# Patient Record
Sex: FEMALE | Race: BLACK OR AFRICAN AMERICAN | NOT HISPANIC OR LATINO | Employment: OTHER | URBAN - METROPOLITAN AREA
[De-identification: names, ages, dates, MRNs, and addresses within clinical notes are randomized per-mention and may not be internally consistent; named-entity substitution may affect disease eponyms.]

---

## 2023-03-29 ENCOUNTER — HOSPITAL ENCOUNTER (OUTPATIENT)
Dept: DATA CONVERSION | Facility: HOSPITAL | Age: 83
End: 2023-03-29
Attending: INTERNAL MEDICINE | Admitting: INTERNAL MEDICINE
Payer: MEDICARE

## 2023-03-29 DIAGNOSIS — E11.9 TYPE 2 DIABETES MELLITUS WITHOUT COMPLICATIONS (MULTI): ICD-10-CM

## 2023-03-29 DIAGNOSIS — I13.0 HYPERTENSIVE HEART AND CHRONIC KIDNEY DISEASE WITH HEART FAILURE AND STAGE 1 THROUGH STAGE 4 CHRONIC KIDNEY DISEASE, OR UNSPECIFIED CHRONIC KIDNEY DISEASE (MULTI): ICD-10-CM

## 2023-03-29 DIAGNOSIS — E04.2 NONTOXIC MULTINODULAR GOITER: ICD-10-CM

## 2023-03-29 DIAGNOSIS — N18.30 CHRONIC KIDNEY DISEASE, STAGE 3 UNSPECIFIED (MULTI): ICD-10-CM

## 2023-03-29 DIAGNOSIS — I50.9 HEART FAILURE, UNSPECIFIED (MULTI): ICD-10-CM

## 2023-03-29 DIAGNOSIS — E11.22 TYPE 2 DIABETES MELLITUS WITH DIABETIC CHRONIC KIDNEY DISEASE (MULTI): ICD-10-CM

## 2023-03-29 DIAGNOSIS — E04.9 NONTOXIC GOITER, UNSPECIFIED: ICD-10-CM

## 2023-03-30 LAB
COMPLETE PATHOLOGY REPORT: NORMAL
CONVERTED ADDENDUM DIAGNOSIS 2: NORMAL
CONVERTED CLINICAL DIAGNOSIS-HISTORY: NORMAL
CONVERTED DIAGNOSIS COMMENT: NORMAL
CONVERTED FINAL DIAGNOSIS: NORMAL
CONVERTED FINAL REPORT PDF LINK TO COPY AND PASTE: NORMAL
CONVERTED SPECIMEN DESCRIPTION: NORMAL

## 2023-04-10 LAB
ALBUMIN (G/DL) IN SER/PLAS: 4 G/DL (ref 3.4–5)
ALBUMIN (MG/L) IN URINE: 33.4 MG/L
ALBUMIN/CREATININE (UG/MG) IN URINE: 12.8 UG/MG CRT (ref 0–30)
ANION GAP IN SER/PLAS: 11 MMOL/L (ref 10–20)
APPEARANCE, URINE: ABNORMAL
BACTERIA, URINE: ABNORMAL /HPF
BASOPHILS (10*3/UL) IN BLOOD BY AUTOMATED COUNT: 0.03 X10E9/L (ref 0–0.1)
BASOPHILS/100 LEUKOCYTES IN BLOOD BY AUTOMATED COUNT: 1 % (ref 0–2)
BILIRUBIN, URINE: NEGATIVE
BLOOD, URINE: NEGATIVE
BUDDING YEAST, URINE: PRESENT /HPF
CALCIUM (MG/DL) IN SER/PLAS: 8.8 MG/DL (ref 8.6–10.3)
CARBON DIOXIDE, TOTAL (MMOL/L) IN SER/PLAS: 29 MMOL/L (ref 21–32)
CHLORIDE (MMOL/L) IN SER/PLAS: 105 MMOL/L (ref 98–107)
COLOR, URINE: YELLOW
CREATININE (MG/DL) IN SER/PLAS: 1.07 MG/DL (ref 0.5–1.05)
CREATININE (MG/DL) IN URINE: 260 MG/DL (ref 20–320)
EOSINOPHILS (10*3/UL) IN BLOOD BY AUTOMATED COUNT: 0.05 X10E9/L (ref 0–0.4)
EOSINOPHILS/100 LEUKOCYTES IN BLOOD BY AUTOMATED COUNT: 1.7 % (ref 0–6)
ERYTHROCYTE DISTRIBUTION WIDTH (RATIO) BY AUTOMATED COUNT: 15.3 % (ref 11.5–14.5)
ERYTHROCYTE MEAN CORPUSCULAR HEMOGLOBIN CONCENTRATION (G/DL) BY AUTOMATED: 31.2 G/DL (ref 32–36)
ERYTHROCYTE MEAN CORPUSCULAR VOLUME (FL) BY AUTOMATED COUNT: 91 FL (ref 80–100)
ERYTHROCYTES (10*6/UL) IN BLOOD BY AUTOMATED COUNT: 4.2 X10E12/L (ref 4–5.2)
GFR FEMALE: 52 ML/MIN/1.73M2
GLUCOSE (MG/DL) IN SER/PLAS: 84 MG/DL (ref 74–99)
GLUCOSE, URINE: NEGATIVE MG/DL
HEMATOCRIT (%) IN BLOOD BY AUTOMATED COUNT: 38.2 % (ref 36–46)
HEMOGLOBIN (G/DL) IN BLOOD: 11.9 G/DL (ref 12–16)
HYALINE CASTS, URINE: ABNORMAL /LPF
IMMATURE GRANULOCYTES/100 LEUKOCYTES IN BLOOD BY AUTOMATED COUNT: 0 % (ref 0–0.9)
KETONES, URINE: NEGATIVE MG/DL
LEUKOCYTE ESTERASE, URINE: ABNORMAL
LEUKOCYTES (10*3/UL) IN BLOOD BY AUTOMATED COUNT: 2.9 X10E9/L (ref 4.4–11.3)
LYMPHOCYTES (10*3/UL) IN BLOOD BY AUTOMATED COUNT: 1.34 X10E9/L (ref 0.8–3)
LYMPHOCYTES/100 LEUKOCYTES IN BLOOD BY AUTOMATED COUNT: 45.9 % (ref 13–44)
MONOCYTES (10*3/UL) IN BLOOD BY AUTOMATED COUNT: 0.36 X10E9/L (ref 0.05–0.8)
MONOCYTES/100 LEUKOCYTES IN BLOOD BY AUTOMATED COUNT: 12.3 % (ref 2–10)
MUCUS, URINE: ABNORMAL /LPF
NEUTROPHILS (10*3/UL) IN BLOOD BY AUTOMATED COUNT: 1.14 X10E9/L (ref 1.6–5.5)
NEUTROPHILS/100 LEUKOCYTES IN BLOOD BY AUTOMATED COUNT: 39.1 % (ref 40–80)
NITRITE, URINE: NEGATIVE
PARATHYRIN INTACT (PG/ML) IN SER/PLAS: 22.1 PG/ML (ref 18.5–88)
PH, URINE: 5 (ref 5–8)
PHOSPHATE (MG/DL) IN SER/PLAS: 3.1 MG/DL (ref 2.5–4.9)
PLATELETS (10*3/UL) IN BLOOD AUTOMATED COUNT: 235 X10E9/L (ref 150–450)
POTASSIUM (MMOL/L) IN SER/PLAS: 4.2 MMOL/L (ref 3.5–5.3)
PROTEIN, URINE: ABNORMAL MG/DL
RBC, URINE: 6 /HPF (ref 0–5)
SODIUM (MMOL/L) IN SER/PLAS: 141 MMOL/L (ref 136–145)
SPECIFIC GRAVITY, URINE: 1.02 (ref 1–1.03)
SQUAMOUS EPITHELIAL CELLS, URINE: 14 /HPF
UREA NITROGEN (MG/DL) IN SER/PLAS: 14 MG/DL (ref 6–23)
UROBILINOGEN, URINE: <2 MG/DL (ref 0–1.9)
WBC, URINE: 18 /HPF (ref 0–5)

## 2023-05-17 ENCOUNTER — TELEPHONE (OUTPATIENT)
Dept: PRIMARY CARE | Facility: CLINIC | Age: 83
End: 2023-05-17
Payer: MEDICARE

## 2023-09-11 PROBLEM — G45.3 AMAUROSIS FUGAX OF RIGHT EYE: Status: ACTIVE | Noted: 2023-09-11

## 2023-09-11 PROBLEM — M79.641 PAIN OF RIGHT HAND: Status: ACTIVE | Noted: 2023-09-11

## 2023-09-11 PROBLEM — M72.2 PLANTAR FASCIITIS, BILATERAL: Status: ACTIVE | Noted: 2023-09-11

## 2023-09-11 PROBLEM — M79.671 BILATERAL LEG AND FOOT PAIN: Status: ACTIVE | Noted: 2023-09-11

## 2023-09-11 PROBLEM — M25.559 LATERAL PAIN OF HIP: Status: ACTIVE | Noted: 2023-09-11

## 2023-09-11 PROBLEM — Z78.0 MENOPAUSE: Status: ACTIVE | Noted: 2023-09-11

## 2023-09-11 PROBLEM — I50.32 CHRONIC DIASTOLIC HEART FAILURE (MULTI): Status: ACTIVE | Noted: 2023-09-11

## 2023-09-11 PROBLEM — R47.01 EXPRESSIVE APHASIA: Status: ACTIVE | Noted: 2023-09-11

## 2023-09-11 PROBLEM — M89.9 SCAPULAR DYSFUNCTION: Status: ACTIVE | Noted: 2023-09-11

## 2023-09-11 PROBLEM — M76.30 ITB SYNDROME: Status: ACTIVE | Noted: 2023-09-11

## 2023-09-11 PROBLEM — M70.62 TROCHANTERIC BURSITIS OF LEFT HIP: Status: ACTIVE | Noted: 2023-09-11

## 2023-09-11 PROBLEM — H90.3 BILATERAL SENSORINEURAL HEARING LOSS: Status: ACTIVE | Noted: 2023-09-11

## 2023-09-11 PROBLEM — H26.493 AFTER CATARACT OF BOTH EYES NOT OBSCURING VISION: Status: ACTIVE | Noted: 2023-09-11

## 2023-09-11 PROBLEM — M79.672 BILATERAL LEG AND FOOT PAIN: Status: ACTIVE | Noted: 2023-09-11

## 2023-09-11 PROBLEM — M25.562 LEFT KNEE PAIN: Status: ACTIVE | Noted: 2023-09-11

## 2023-09-11 PROBLEM — G89.29 CHRONIC LEFT SHOULDER PAIN: Status: ACTIVE | Noted: 2023-09-11

## 2023-09-11 PROBLEM — E04.2 MULTINODULAR GOITER: Status: ACTIVE | Noted: 2023-09-11

## 2023-09-11 PROBLEM — L65.0 TELOGEN EFFLUVIUM: Status: ACTIVE | Noted: 2023-06-19

## 2023-09-11 PROBLEM — N18.30 CKD (CHRONIC KIDNEY DISEASE) STAGE 3, GFR 30-59 ML/MIN (MULTI): Status: ACTIVE | Noted: 2023-09-11

## 2023-09-11 PROBLEM — M79.604 BILATERAL LEG AND FOOT PAIN: Status: ACTIVE | Noted: 2023-09-11

## 2023-09-11 PROBLEM — M99.09 SEGMENTAL AND SOMATIC DYSFUNCTION: Status: ACTIVE | Noted: 2023-09-11

## 2023-09-11 PROBLEM — I49.5 SINUS NODE DYSFUNCTION (MULTI): Status: ACTIVE | Noted: 2023-09-11

## 2023-09-11 PROBLEM — I10 HTN (HYPERTENSION): Status: ACTIVE | Noted: 2023-09-11

## 2023-09-11 PROBLEM — R01.1 SYSTOLIC MURMUR: Status: ACTIVE | Noted: 2023-09-11

## 2023-09-11 PROBLEM — M79.18 MYALGIA, OTHER SITE: Status: ACTIVE | Noted: 2023-09-11

## 2023-09-11 PROBLEM — R00.1 BRADYCARDIA: Status: ACTIVE | Noted: 2023-09-11

## 2023-09-11 PROBLEM — L65.9 NONSCARRING HAIR LOSS, UNSPECIFIED: Status: ACTIVE | Noted: 2023-06-19

## 2023-09-11 PROBLEM — M17.11 OSTEOARTHRITIS OF RIGHT KNEE: Status: ACTIVE | Noted: 2023-09-11

## 2023-09-11 PROBLEM — L57.9 SKIN CHANGES DUE TO CHRONIC EXPOSURE TO NONIONIZING RADIATION, UNSPECIFIED: Status: ACTIVE | Noted: 2023-06-19

## 2023-09-11 PROBLEM — E78.5 HYPERLIPIDEMIA: Status: ACTIVE | Noted: 2023-09-11

## 2023-09-11 PROBLEM — M54.50 LOW BACK PAIN: Status: ACTIVE | Noted: 2023-09-11

## 2023-09-11 PROBLEM — R00.2 PALPITATIONS: Status: ACTIVE | Noted: 2023-09-11

## 2023-09-11 PROBLEM — M79.605 BILATERAL LEG AND FOOT PAIN: Status: ACTIVE | Noted: 2023-09-11

## 2023-09-11 PROBLEM — L85.3 XEROSIS CUTIS: Status: ACTIVE | Noted: 2023-06-19

## 2023-09-11 PROBLEM — G45.9 TIA (TRANSIENT ISCHEMIC ATTACK): Status: ACTIVE | Noted: 2023-09-11

## 2023-09-11 PROBLEM — M25.512 CHRONIC LEFT SHOULDER PAIN: Status: ACTIVE | Noted: 2023-09-11

## 2023-09-11 PROBLEM — H43.811 PVD (POSTERIOR VITREOUS DETACHMENT), RIGHT EYE: Status: ACTIVE | Noted: 2023-09-11

## 2023-09-11 PROBLEM — D64.9 ANEMIA: Status: ACTIVE | Noted: 2023-09-11

## 2023-09-11 PROBLEM — G45.3 AMAUROSIS FUGAX OF LEFT EYE: Status: ACTIVE | Noted: 2023-09-11

## 2023-09-11 PROBLEM — E11.9 DIABETES MELLITUS TYPE 2 WITHOUT RETINOPATHY (MULTI): Status: ACTIVE | Noted: 2023-09-11

## 2023-09-11 PROBLEM — M25.552 LATERAL PAIN OF LEFT HIP: Status: ACTIVE | Noted: 2023-09-11

## 2023-09-11 PROBLEM — Z96.1 PSEUDOPHAKIA OF BOTH EYES: Status: ACTIVE | Noted: 2023-09-11

## 2023-09-11 PROBLEM — H52.13 BILATERAL MYOPIA: Status: ACTIVE | Noted: 2023-09-11

## 2023-09-11 PROBLEM — R07.9 CHEST PAIN: Status: ACTIVE | Noted: 2023-09-11

## 2023-09-11 PROBLEM — Z87.39 H/O CALCIUM PYROPHOSPHATE DEPOSITION DISEASE (CPPD): Status: ACTIVE | Noted: 2023-09-11

## 2023-09-11 RX ORDER — ASPIRIN 81 MG/1
81 TABLET ORAL DAILY
COMMUNITY
End: 2023-10-13 | Stop reason: WASHOUT

## 2023-09-11 RX ORDER — HYDRALAZINE HYDROCHLORIDE 25 MG/1
2 TABLET, FILM COATED ORAL 2 TIMES DAILY
COMMUNITY
Start: 2022-01-25 | End: 2023-10-11

## 2023-09-11 RX ORDER — LISINOPRIL 10 MG/1
1 TABLET ORAL DAILY
COMMUNITY
Start: 2019-05-30 | End: 2023-10-05

## 2023-09-11 RX ORDER — BLOOD-GLUCOSE METER
1 EACH MISCELLANEOUS
COMMUNITY
Start: 2022-03-31 | End: 2023-10-13 | Stop reason: WASHOUT

## 2023-09-11 RX ORDER — AMLODIPINE BESYLATE 10 MG/1
10 TABLET ORAL DAILY
COMMUNITY
End: 2023-11-01

## 2023-09-11 RX ORDER — GABAPENTIN 100 MG/1
100 CAPSULE ORAL 3 TIMES DAILY PRN
COMMUNITY
Start: 2023-03-10 | End: 2023-10-13 | Stop reason: WASHOUT

## 2023-09-11 RX ORDER — METFORMIN HYDROCHLORIDE 500 MG/1
1 TABLET ORAL
COMMUNITY
Start: 2022-03-31 | End: 2023-10-13 | Stop reason: WASHOUT

## 2023-09-11 RX ORDER — LOSARTAN POTASSIUM 50 MG/1
100 TABLET ORAL DAILY
COMMUNITY
End: 2023-11-01 | Stop reason: DRUGHIGH

## 2023-10-05 ENCOUNTER — OFFICE VISIT (OUTPATIENT)
Dept: CARDIOLOGY | Facility: CLINIC | Age: 83
End: 2023-10-05
Payer: MEDICARE

## 2023-10-05 VITALS
WEIGHT: 130 LBS | SYSTOLIC BLOOD PRESSURE: 140 MMHG | HEIGHT: 65 IN | BODY MASS INDEX: 21.66 KG/M2 | DIASTOLIC BLOOD PRESSURE: 76 MMHG | HEART RATE: 57 BPM | OXYGEN SATURATION: 97 %

## 2023-10-05 DIAGNOSIS — R07.9 CHEST PAIN, UNSPECIFIED TYPE: Primary | ICD-10-CM

## 2023-10-05 PROCEDURE — 99215 OFFICE O/P EST HI 40 MIN: CPT

## 2023-10-05 PROCEDURE — 1125F AMNT PAIN NOTED PAIN PRSNT: CPT

## 2023-10-05 PROCEDURE — 3078F DIAST BP <80 MM HG: CPT

## 2023-10-05 PROCEDURE — 1036F TOBACCO NON-USER: CPT

## 2023-10-05 PROCEDURE — 1159F MED LIST DOCD IN RCRD: CPT

## 2023-10-05 PROCEDURE — 3077F SYST BP >= 140 MM HG: CPT

## 2023-10-05 NOTE — PROGRESS NOTES
Subjective   Patient ID: Mrs. Surekha Lorenzo is a 82-year-old -American female with a past medical history of hypertension, CKD stage III, hyperlipidemia and heart failure preserved EF 65-70%, left ventricular cavity size is decreased on echocardiogram March 20, 2022. Today here as a hospital follow up.     14 day Holter Monitor 1/25/22 -2/8/22 :There were 2 occurrences of supraventricular tachycardia with the longest episode 7 beats. There were 6 patient triggered events 1 SVE, and 6 bradycardia. Average HR of 48. There were no atrial fibrillation/a flutter, pauses, AV blocks noted.    Patient reports that she was recently evaluated in the ED with hypertension  systolic after PCP appt. Per patient she states that she only cuts dose of amlodipine in half. She also reports to having chest discomfort that comes and goes but does not worsen with activity. Patient reports that chest pain is not worsened by exercise. Patient continues to be bradycardiac and per EP at [facility] recommended low intensity protocol stress test to quantify chronotropic incompetency and hydralazine was discontinued. Pt denies shortness of breath, dyspnea on exertion, orthopnea, and paroxysmal nocturnal dyspnea.  Pt denies worsening lower extremity edema.  Pt denies palpitations or syncope.  No recent falls.  No fever or chills.  No cough.  No change in bowel or bladder habits.  No sick contacts.  No recent travel.  12 point review of systems including (Constitutional, Eyes, ENMT, Respiratory, Cardiac, Gastrointestinal, Neurological, Psychiatric, and Hematologic) was performed and is otherwise negative unless noted in HPI.    Past Medical History: As above.    Medications Reviewed.    Allergies were reviewed.    Social History: Denies use of tobacco or ETOH, She fairly active, lives at home alone.    Family History: No pertinent family history at this time.     Objective   I personally reviewed the patients recent labs, medications, orders,  EKGs, and pertinent cardiac imaging/ echocardiography and ischemic evaluations including stress testing/ cardiac catheterization.    Vitals:    10/05/23 0930   BP: 140/76   Pulse: 57   SpO2: 97%       Physical Exam  Constitutional:       Appearance: Normal appearance.   Neck:      Vascular: No carotid bruit.   Cardiovascular:      Rate and Rhythm: Normal rate and regular rhythm.      Heart sounds: No murmur heard.  Pulmonary:      Effort: Pulmonary effort is normal.      Breath sounds: Normal breath sounds.   Abdominal:      Palpations: Abdomen is soft.   Skin:     General: Skin is warm.   Neurological:      Mental Status: She is alert and oriented to person, place, and time.   Psychiatric:         Mood and Affect: Mood normal.       Assessment/Plan        # Uncontrolled HTN: mildly elevated today.  -Continue taking losartan 100 mg daily, amlodipine 10 mg daily.    # Chest Pain  - Nuclear exercise stress test for further ischemic evaluation and to determine  quantify chronotropic incompetency . Please avoid caffeine the day prior to and the day of the stress test. Do not eat the morning of the stress test or 12 hours prior.     -  Follow up with tele visit    No medication changes    Yue DEWEY-CNP

## 2023-10-09 ENCOUNTER — DOCUMENTATION (OUTPATIENT)
Dept: PHYSICAL THERAPY | Facility: CLINIC | Age: 83
End: 2023-10-09
Payer: MEDICARE

## 2023-10-09 NOTE — PROGRESS NOTES
Discharge Summary    Name: Surekha Lorenzo  MRN: [id number]  : 1940  Date: 10/09/23    Discharge Summary: PT    Discharge Information: Date of discharge 10/9/23, Date of last visit 23, Date of evaluation 7/10/23, Number of attended visits 6, Referred by Gonzalez, and Referred for L hip pain    Discharge Status: unknown     Rehab Discharge Reason: Attendance inconsistent and Failed to schedule and/or keep follow-up appointment(s)

## 2023-10-10 ENCOUNTER — APPOINTMENT (OUTPATIENT)
Dept: CARDIOLOGY | Facility: HOSPITAL | Age: 83
End: 2023-10-10
Payer: MEDICARE

## 2023-10-10 ENCOUNTER — APPOINTMENT (OUTPATIENT)
Dept: RADIOLOGY | Facility: HOSPITAL | Age: 83
End: 2023-10-10
Payer: MEDICARE

## 2023-10-11 ENCOUNTER — OFFICE VISIT (OUTPATIENT)
Dept: CARDIOLOGY | Facility: CLINIC | Age: 83
End: 2023-10-11
Payer: MEDICARE

## 2023-10-11 DIAGNOSIS — I10 PRIMARY HYPERTENSION: Primary | ICD-10-CM

## 2023-10-11 PROCEDURE — 1125F AMNT PAIN NOTED PAIN PRSNT: CPT

## 2023-10-11 PROCEDURE — 99441 PR PHYS/QHP TELEPHONE EVALUATION 5-10 MIN: CPT

## 2023-10-11 PROCEDURE — 1036F TOBACCO NON-USER: CPT

## 2023-10-11 PROCEDURE — 1159F MED LIST DOCD IN RCRD: CPT

## 2023-10-11 NOTE — PROGRESS NOTES
Mrs. Surekha Lorenzo is a 83-year-old -American female with a past medical history of hypertension, CKD stage III, hyperlipidemia and heart failure preserved EF 65-70%, left ventricular cavity size is decreased on echocardiogram March 20, 2022. Today we have a tele-visit to discuss medications.    She reports that she started taking hydralazine again and states that blood pressure is improved 130/70's, headaches have stopped.  Chest discomfort has lessoned. Pt denies shortness of breath, dyspnea on exertion, orthopnea, and paroxysmal nocturnal dyspnea.  Pt denies worsening lower extremity edema.  Pt denies palpitations or syncope.  No recent falls.  No fever or chills.  No cough.  No change in bowel or bladder habits.  No sick contacts.  No recent travel.    An interactive audio  telecommunication system which permits real time communications between the patient (at the originating site) and provider (at the distant site) was utilized to provide this telehealth service. Verbal consent was requested and obtained from the patient on this date for a telehealth visit. Time spent with patient was 10 minutes of which greater than 50 percent was spent counseling and coordinating care.    Patient to continue taking Hydralazine 25 mg one tab twice daily, aspirin 81 mg daily, losartan 100 mg daily.    Previous visit patient had c/o chest discomfort. Nuclear exercise stress ordered for chest pain as well as to quantify chronotropic incompetency. If unable to make heart rate patient may be switched to Lexiscan.  It is scheduled for next week.    Follow up in 4 weeks    Yue BLAKE     Patient to follow up

## 2023-10-12 ENCOUNTER — EVALUATION (OUTPATIENT)
Dept: SPEECH THERAPY | Facility: CLINIC | Age: 83
End: 2023-10-12
Payer: MEDICARE

## 2023-10-12 DIAGNOSIS — R47.01 EXPRESSIVE APHASIA: Primary | ICD-10-CM

## 2023-10-12 DIAGNOSIS — I69.320 APHASIA DUE TO RECENT STROKE: ICD-10-CM

## 2023-10-12 PROCEDURE — 92507 TX SP LANG VOICE COMM INDIV: CPT | Mod: GN | Performed by: SPEECH-LANGUAGE PATHOLOGIST

## 2023-10-12 NOTE — LETTER
October 13, 2023     Patient: Surekha Lorenoz   YOB: 1940   Date of Visit: 10/12/2023       To Whom it May Concern:    Surekha Lorenzo was seen in my clinic on 10/12/2023. She {Return to school/sport:75784}.    If you have any questions or concerns, please don't hesitate to call.         Sincerely,          Donya Borrero, SLP        CC: No Recipients

## 2023-10-12 NOTE — LETTER
October 13, 2023    BENNETT Lopez  [address], [address]  [place] [facility] [address]    Patient: Surekha Lorenzo   YOB: 1940   Date of Visit: 10/12/2023       Dear Yue Batista, Aprn-cnp  [address] [address]    The attached plan of care is being sent to you because your patient’s medical reimbursement requires that you certify the plan of care. Your signature is required to allow uninterrupted insurance coverage.      You may indicate your approval by signing below and faxing this form back to us at Dept Fax: [phone].    Please call Dept: [phone] with any questions or concerns.    Thank you for this referral,        BENNETT Lopez  [facility] [address] [place] [address]  [address] [address]    Payer: Payor: MEDICARE / Plan: MEDICARE PART A AND B / Product Type: *No Product type* /                                                                         Date:     Dear BENNETT Lopez,     Re: Ms. Surekha Lorenzo, MRN:[id number]    I certify that I have reviewed the attached plan of care and it is medically necessary for Ms. Surekha Lorenzo (1940) who is under my care.          ______________________________________                    _________________  Provider name and credentials                                           Date and time                                                                                           Plan of Care 10/12/23   Effective from: 10/12/2023  Effective to: 1/10/2024    Plan ID: [id number]            Participants as of Finalize on 10/13/2023    Name Type Comments Contact Info    BENNETT Lopez Speech Language Pathologist  [phone]    Imer Mccormick MD PCP - General  [phone]       Last Plan Note     Author: BENNETT Lopez Status: Incomplete Last edited: 10/12/2023 10:30 AM       Speech-Language Pathology    Outpatient Speech-Language Pathology Treatment     Patient Name: Surekha KAM  Bj  MRN: [id number]  Today's Date: 10/13/2023  Time Calculation  Start Time: 1030  Stop Time: 1115  Time Calculation (min): 45 min     Patient is being seen for their first follow-up visit in [facility] this date. For full history, evaluation, and other details from previous care to-date, please refer to past medical records in Ambulatory Electronic Medical Records. Most recent eval/re-eval was completed on 2023. Per patient report, patient was admitted to the hospital for several days earlier in September- her abilities have not changed much since her initial evaluation in 2023. Results of that evaluation are as follows:  Language:    Assessments completed: formal   Patient was given the Thurston Diagnostic Aphasia Examination (BDAE) this date to fully assess her speech and language abilities. Patient scored in the average range across all tasks except for naming and picture-word matching. Her articulatory agility was judged to be within normal limits. She demonstrated difficulty with word finding within conversational speech. Patient scores on subtests are as follows:  Articulatory Agility:  100th percentile  Phrase Length:  100th percentile  Grammatical Form:  100th percentile  Melodic Line:  100th percentile  Word Comprehension:  100th percentile  Commands: 10/10 100th percentile   Complex Ideational Material:  100th percentile  Automatized Sequences:  100th percentile  Repetition of Single Words:  100th percentile  Repetition of Sentences: / 100th percentile  Responsive Naming: 10/10 100th percentile  Thurston Naming Test: 8/15 50th percentile  Screening of Special Categories:  45th percentile  Matching across cases and scripts:  100th percentile  Number matchin/4 100th percentile  Word Identification- Picture/Word Matching: 3/4 40th percentile  Basic Oral Word Reading: 15/15 100th percentile  Oral Reading of Sentences with Comprehension:  100th  "percentile  Comprehension: 3/3 100th percentile  Reading Comprehension- Sentences and Paragraphs: 4/4 100th percentile  Mechanics of Writing: Patient was able to copy \"The quick brown bagley jumps over the lazy dog\"  Overall, patient demonstrates anomia which impairs her ability to communicate her thoughts with others at this time. She would benefit from skilled speech therapy at this time to improve her word finding abilities and help her communicate well with others.     Current Problem:   Patient Active Problem List   Diagnosis   • After cataract of both eyes not obscuring vision   • Anemia   • Bilateral leg and foot pain   • Bilateral myopia   • Bilateral sensorineural hearing loss   • Bradycardia   • Chest pain   • Chronic diastolic heart failure (CMS/HCC)   • Chronic left shoulder pain   • CKD (chronic kidney disease) stage 3, GFR 30-59 ml/min (CMS/HCC)   • Diabetes mellitus type 2 without retinopathy (CMS/HCC)   • HTN (hypertension)   • Hyperlipidemia   • Left knee pain   • Low back pain   • Multinodular goiter   • Myalgia, other site   • Nonscarring hair loss, unspecified   • Osteoarthritis of right knee   • Pain of right hand   • Palpitations   • Plantar fasciitis, bilateral   • Pseudophakia of both eyes   • Scapular dysfunction   • Segmental and somatic dysfunction   • Sinus node dysfunction (CMS/HCC)   • Skin changes due to chronic exposure to nonionizing radiation, unspecified   • Systolic murmur   • Telogen effluvium   • Trochanteric bursitis of left hip   • Xerosis cutis   • TIA (transient ischemic attack)   • PVD (posterior vitreous detachment), right eye   • Menopause   • Lateral pain of left hip   • Lateral pain of hip   • ITB syndrome   • H/O calcium pyrophosphate deposition disease (CPPD)   • Expressive aphasia   • Amaurosis fugax of left eye   • Amaurosis fugax of right eye   • Aphasia due to recent stroke         SLP Assessment:   Patient was able to generate words to definition in 70% of " opportunities. She was able to add words to a list of 3 based on category in 65% of opportunities, increasing to 80% when provided with moderate to maximum verbal cueing. Patient provided with worksheets targeting word finding for homework. Reviewed strategy of circumlocution to assist with word finding within conversations.        Plan:   1x/week, 4 weeks      Subjective  Patient was pleasant and participated well throughout the therapy session this date.       General Visit Information:   Certification Period Start Date: 10/12/23  Certification Period End Date: 01/10/24  Total Number of Visits : 1      Pain Assessment:   Pain Assessment: 0-10  Pain Score: 0 - No pain      Objective  By discharge ANSHUL DAO will achieve the following goals:   Long Term Goal:  1. Patient will return to prior level of communicative function in order to adequately participate in everyday activities.   Short Term Goals:   1. Patient will complete confrontational naming tasks with 80% accuracy independently.   2. Patient will complete generative naming tasks with 80% accuracy independently.  3. Patient will complete sentence completion tasks with 90% accuracy independently.  4. Patient will match written labels to pictures with 90% accuracy independently.  5. Patient/family education to be provided each session.     Outpatient Education:   Education was provided to pt/family and reviewed how to carryover strategies learned in session to home.            Current Participants as of 10/13/2023    Name Type Comments Contact Info    Donya Borrero, SLP Speech Language Pathologist  [phone]    Signature pending    Imer Mccormick MD PCP - General  [phone]    Signature pending

## 2023-10-12 NOTE — LETTER
October 13, 2023     Patient: Surekha Lorenzo   YOB: 1940   Date of Visit: 10/12/2023       To Whom It May Concern:    It is my medical opinion that Surekha Lorenzo {Work release (duty restriction):84335}.    If you have any questions or concerns, please don't hesitate to call.         Sincerely,        Donya Borrero, SLP    CC: No Recipients

## 2023-10-12 NOTE — PROGRESS NOTES
Speech-Language Pathology    Outpatient Speech-Language Pathology Treatment     Patient Name: Surekha Lorenzo  MRN: [id number]  Today's Date: 10/13/2023  Time Calculation  Start Time: 1030  Stop Time: 1115  Time Calculation (min): 45 min     Patient is being seen for their first follow-up visit in [facility] this date. For full history, evaluation, and other details from previous care to-date, please refer to past medical records in Ambulatory Electronic Medical Records. Most recent eval/re-eval was completed on 2023. Per patient report, patient was admitted to the hospital for several days earlier in September- her abilities have not changed much since her initial evaluation in 2023. Results of that evaluation are as follows:  Language:    Assessments completed: formal   Patient was given the [place]s Diagnostic Aphasia Examination (BDAE) this date to fully assess her speech and language abilities. Patient scored in the average range across all tasks except for naming and picture-word matching. Her articulatory agility was judged to be within normal limits. She demonstrated difficulty with word finding within conversational speech. Patient scores on subtests are as follows:  Articulatory Agility:  100th percentile  Phrase Length:  100th percentile  Grammatical Form:  100th percentile  Melodic Line:  100th percentile  Word Comprehension:  100th percentile  Commands: 10/10 100th percentile   Complex Ideational Material:  100th percentile  Automatized Sequences:  100th percentile  Repetition of Single Words: /5 100th percentile  Repetition of Sentences: /2 100th percentile  Responsive Naming: 10/10 100th percentile  Sims Naming Test: 8/15 50th percentile  Screening of Special Categories:  45th percentile  Matching across cases and scripts:  100th percentile  Number matchin/4 100th percentile  Word Identification- Picture/Word Matching: 3/4 40th percentile  Basic Oral Word Reading:  "15/15 100th percentile  Oral Reading of Sentences with Comprehension: 5/5 100th percentile  Comprehension: 3/3 100th percentile  Reading Comprehension- Sentences and Paragraphs: 4/4 100th percentile  Mechanics of Writing: Patient was able to copy \"The quick brown bagley jumps over the lazy dog\"  Overall, patient demonstrates anomia which impairs her ability to communicate her thoughts with others at this time. She would benefit from skilled speech therapy at this time to improve her word finding abilities and help her communicate well with others.     Current Problem:   Patient Active Problem List   Diagnosis    After cataract of both eyes not obscuring vision    Anemia    Bilateral leg and foot pain    Bilateral myopia    Bilateral sensorineural hearing loss    Bradycardia    Chest pain    Chronic diastolic heart failure (CMS/HCC)    Chronic left shoulder pain    CKD (chronic kidney disease) stage 3, GFR 30-59 ml/min (CMS/HCC)    Diabetes mellitus type 2 without retinopathy (CMS/HCC)    HTN (hypertension)    Hyperlipidemia    Left knee pain    Low back pain    Multinodular goiter    Myalgia, other site    Nonscarring hair loss, unspecified    Osteoarthritis of right knee    Pain of right hand    Palpitations    Plantar fasciitis, bilateral    Pseudophakia of both eyes    Scapular dysfunction    Segmental and somatic dysfunction    Sinus node dysfunction (CMS/HCC)    Skin changes due to chronic exposure to nonionizing radiation, unspecified    Systolic murmur    Telogen effluvium    Trochanteric bursitis of left hip    Xerosis cutis    TIA (transient ischemic attack)    PVD (posterior vitreous detachment), right eye    Menopause    Lateral pain of left hip    Lateral pain of hip    ITB syndrome    H/O calcium pyrophosphate deposition disease (CPPD)    Expressive aphasia    Amaurosis fugax of left eye    Amaurosis fugax of right eye    Aphasia due to recent stroke         SLP Assessment:   Patient was able to generate " words to definition in 70% of opportunities. She was able to add words to a list of 3 based on category in 65% of opportunities, increasing to 80% when provided with moderate to maximum verbal cueing. Patient provided with worksheets targeting word finding for homework. Reviewed strategy of circumlocution to assist with word finding within conversations.        Plan:   1x/week, 4 weeks      Subjective   Patient was pleasant and participated well throughout the therapy session this date.       General Visit Information:   Certification Period Start Date: 10/12/23  Certification Period End Date: 01/10/24  Total Number of Visits : 1      Pain Assessment:   Pain Assessment: 0-10  Pain Score: 0 - No pain      Objective   By discharge ANSHUL DAO will achieve the following goals:   Long Term Goal:  1. Patient will return to prior level of communicative function in order to adequately participate in everyday activities.   Short Term Goals:   1. Patient will complete confrontational naming tasks with 80% accuracy independently.   2. Patient will complete generative naming tasks with 80% accuracy independently.  3. Patient will complete sentence completion tasks with 90% accuracy independently.  4. Patient will match written labels to pictures with 90% accuracy independently.  5. Patient/family education to be provided each session.     Outpatient Education:   Education was provided to pt/family and reviewed how to carryover strategies learned in session to home.

## 2023-10-13 ENCOUNTER — OFFICE VISIT (OUTPATIENT)
Dept: NEUROLOGY | Facility: HOSPITAL | Age: 83
End: 2023-10-13
Payer: MEDICARE

## 2023-10-13 VITALS
RESPIRATION RATE: 18 BRPM | HEIGHT: 65 IN | SYSTOLIC BLOOD PRESSURE: 150 MMHG | DIASTOLIC BLOOD PRESSURE: 67 MMHG | WEIGHT: 132 LBS | BODY MASS INDEX: 21.99 KG/M2 | TEMPERATURE: 96.9 F | HEART RATE: 71 BPM

## 2023-10-13 DIAGNOSIS — I10 PRIMARY HYPERTENSION: ICD-10-CM

## 2023-10-13 DIAGNOSIS — I63.10 CEREBROVASCULAR ACCIDENT (CVA) DUE TO EMBOLISM OF PRECEREBRAL ARTERY (MULTI): Primary | ICD-10-CM

## 2023-10-13 PROBLEM — I69.320 APHASIA DUE TO RECENT STROKE: Status: ACTIVE | Noted: 2023-10-13

## 2023-10-13 PROCEDURE — 1126F AMNT PAIN NOTED NONE PRSNT: CPT | Performed by: PSYCHIATRY & NEUROLOGY

## 2023-10-13 PROCEDURE — 1160F RVW MEDS BY RX/DR IN RCRD: CPT | Performed by: PSYCHIATRY & NEUROLOGY

## 2023-10-13 PROCEDURE — 1159F MED LIST DOCD IN RCRD: CPT | Performed by: PSYCHIATRY & NEUROLOGY

## 2023-10-13 PROCEDURE — 1036F TOBACCO NON-USER: CPT | Performed by: PSYCHIATRY & NEUROLOGY

## 2023-10-13 PROCEDURE — 99215 OFFICE O/P EST HI 40 MIN: CPT | Performed by: PSYCHIATRY & NEUROLOGY

## 2023-10-13 PROCEDURE — 3077F SYST BP >= 140 MM HG: CPT | Performed by: PSYCHIATRY & NEUROLOGY

## 2023-10-13 PROCEDURE — 3078F DIAST BP <80 MM HG: CPT | Performed by: PSYCHIATRY & NEUROLOGY

## 2023-10-13 RX ORDER — ASPIRIN 81 MG/1
81 TABLET ORAL DAILY
Qty: 30 TABLET | Refills: 1 | Status: SHIPPED | OUTPATIENT
Start: 2023-10-13 | End: 2023-11-12

## 2023-10-13 RX ORDER — HYDRALAZINE HYDROCHLORIDE 25 MG/1
25 TABLET, FILM COATED ORAL 2 TIMES DAILY
COMMUNITY
End: 2023-10-13 | Stop reason: SDUPTHER

## 2023-10-13 RX ORDER — HYDRALAZINE HYDROCHLORIDE 50 MG/1
50 TABLET, FILM COATED ORAL 2 TIMES DAILY
Qty: 60 TABLET | Refills: 2 | Status: SHIPPED | OUTPATIENT
Start: 2023-10-13 | End: 2023-11-01 | Stop reason: SDUPTHER

## 2023-10-13 ASSESSMENT — PAIN SCALES - GENERAL
PAINLEVEL: 0-NO PAIN
PAINLEVEL_OUTOF10: 0 - NO PAIN

## 2023-10-13 ASSESSMENT — ENCOUNTER SYMPTOMS
LOSS OF SENSATION IN FEET: 1
DEPRESSION: 0
OCCASIONAL FEELINGS OF UNSTEADINESS: 1

## 2023-10-13 ASSESSMENT — PAIN - FUNCTIONAL ASSESSMENT: PAIN_FUNCTIONAL_ASSESSMENT: 0-10

## 2023-10-13 NOTE — PROGRESS NOTES
Vascular Neurology NPV:    HPI: Surekha Lorenzo is a 83 y.o. right handed female with HTN and HLD, HFpEF, CKD III who presents to stroke clinic for a follow up of ischemic stroke.     In June of 2023 she presented to [facility] with vision loss of her L eye. This occurred quickly and lasted 4 mins. Her vision recovered. This had never happened before.    They were seen at [facility] in 6/23 for symptoms of L eye vision loss. CTH showed no acute abnormality. MRI head showed small diffusion restriction in the R frontal lobe which was thought to be stroke vs artifact. Vessel imaging showed no significant stenosis. Patient was not a candidate for tnk due to othersymptoms resolved . Patient was not not a candidate for embolectomy due to no LVO. TTE showed mildly dilated LA. LDL was 100 A1c was 6.3%. Telemetry showed NSR. Patient was sent home with ziopatch for 2 weeks. She reports it did not show afib (did show SVT). Etiology thought to be Cardioembolic. Patient was started on DAPT per POINT x21 days.    Since being discharged, patient reports her vision has recovered. She is not on a statin due to intolerance (muscle aches). She is not taking ASA due to looking up online and not wanting to take it.     ROS: Denies chest pain, SOB, abdominal pain today.     PMH:   Past Medical History:   Diagnosis Date    Achilles tendinitis, right leg 05/30/2019    Achilles tendinitis of both lower extremities    Acute upper respiratory infection, unspecified 03/06/2019    Acute URI    Cervicalgia 03/11/2021    Cervical spine pain    Contusion of right elbow, initial encounter 10/16/2020    Contusion of right elbow, initial encounter    Contusion of right wrist, initial encounter 10/11/2020    Contusion of right wrist, initial encounter    Effusion, unspecified knee 06/25/2020    Knee swelling    Effusion, unspecified knee 02/04/2021    Knee swelling    Encounter for examination of eyes and vision without abnormal findings 04/02/2020    Eye exam,  routine    Encounter for general adult medical examination without abnormal findings 09/18/2020    Medicare annual wellness visit, subsequent    Fracture of tooth (traumatic), initial encounter for closed fracture 09/09/2020    Fractured tooth    Fracture of unspecified carpal bone, right wrist, initial encounter for closed fracture 11/16/2020    Closed fracture of right wrist, initial encounter    History of falling     History of fall    Impacted cerumen, bilateral 08/27/2020    Impacted cerumen of both ears    Lumbago with sciatica, left side 08/20/2020    Low back pain with left-sided sciatica, unspecified back pain laterality, unspecified chronicity    Lumbago with sciatica, left side 07/08/2021    Chronic bilateral low back pain with bilateral sciatica    Lumbago with sciatica, unspecified side 09/13/2020    Acute left-sided low back pain with sciatica, sciatica laterality unspecified    Otalgia, left ear 04/23/2020    Left ear pain    Other abnormalities of gait and mobility 06/27/2019    Impaired gait and mobility    Other enthesopathies, not elsewhere classified 11/25/2020    Tendinitis of right wrist    Other general symptoms and signs 05/22/2019    Cold intolerance    Other infective otitis externa, left ear 03/02/2020    Infective otitis externa of left ear    Other specified cough 03/06/2019    Cough with sputum    Other specified soft tissue disorders 03/26/2018    Foot swelling    Otitis media, unspecified, left ear 02/09/2020    Acute left otitis media    Pain in left knee 11/11/2019    Acute pain of left knee    Pain in right ankle and joints of right foot 03/26/2018    Acute right ankle pain    Pain in right elbow 11/12/2020    Elbow pain, right    Pain in right hip 11/21/2019    Right hip pain    Pain in right knee 11/11/2019    Acute pain of right knee    Pain in right wrist 03/11/2021    Pain and swelling of right wrist    Personal history of (healed) traumatic fracture 10/17/2020    History of  fracture of wrist    Personal history of diseases of the blood and blood-forming organs and certain disorders involving the immune mechanism 05/17/2017    History of iron deficiency anemia    Personal history of other diseases of the circulatory system     History of hypertension    Personal history of other diseases of the musculoskeletal system and connective tissue 03/11/2021    History of neck pain    Personal history of other diseases of the musculoskeletal system and connective tissue     History of arthritis    Personal history of other diseases of the nervous system and sense organs 08/28/2020    History of chronic otitis media    Personal history of other endocrine, nutritional and metabolic disease 10/16/2021    History of goiter    Personal history of other endocrine, nutritional and metabolic disease 10/16/2021    History of goiter    Personal history of other endocrine, nutritional and metabolic disease 07/08/2021    History of hyperglycemia    Personal history of other endocrine, nutritional and metabolic disease 07/08/2021    History of vitamin D deficiency    Personal history of other infectious and parasitic diseases 02/09/2021    History of herpes zoster    Personal history of other mental and behavioral disorders 08/05/2017    History of anxiety    Personal history of other specified conditions 04/21/2021    History of chronic fatigue    Personal history of other specified conditions 04/21/2021    History of fatigue    Personal history of other specified conditions 08/05/2017    History of insomnia    Persons encountering health services in other specified circumstances 03/11/2021    Encounter to establish care with new doctor    Prediabetes 10/13/2021    Prediabetes    Stiffness of left hand, not elsewhere classified 11/12/2020    Morning joint stiffness of hand, left    Stiffness of right shoulder, not elsewhere classified     Decreased range of motion of right shoulder    Type 2 diabetes  mellitus without complications (CMS/[facility]) 07/23/2021    Controlled type 2 diabetes mellitus without complication, without long-term current use of insulin    Ulnar collateral ligament sprain of right elbow, subsequent encounter 10/27/2020    Sprain of ulnar collateral ligament of right elbow, subsequent encounter    Unilateral primary osteoarthritis, right knee 03/11/2021    Primary osteoarthritis of right knee    Unspecified injury of right wrist, hand and finger(s), subsequent encounter 11/25/2020    Injury of right scapholunate ligament with no instability, subsequent encounter    Unspecified injury of unspecified hip, initial encounter 05/04/2015    Hip injury    Unspecified injury of unspecified wrist, hand and finger(s), initial encounter 10/16/2020    Wrist injury    Unspecified injury of unspecified wrist, hand and finger(s), initial encounter 05/04/2015    Hand injury    Unspecified multiple injuries, initial encounter 05/17/2017    Contusion, multiple sites    Unspecified sprain of right wrist, initial encounter 11/16/2020    Right wrist sprain        PSH:   Past Surgical History:   Procedure Laterality Date    CT HEAD ANGIO W AND WO IV CONTRAST  6/16/2023    CT HEAD ANGIO W AND WO IV CONTRAST 6/16/2023 [facility] CT    CT NECK ANGIO W AND WO IV CONTRAST  6/16/2023    CT NECK ANGIO W AND WO IV CONTRAST 6/16/2023 [facility] CT    OTHER SURGICAL HISTORY  09/18/2020    No history of surgery        Allergies:   Allergies   Allergen Reactions    Prednisone Swelling and Unknown        FH:   Family History   Problem Relation Name Age of Onset    Hypertension Other          SH: Lives in with family members. Feels safe at home. Non-smoker. Alcohol use none. Illicit drug use-none. Occupation: [employer], now retired.    Objective:    Visit Vitals  /67   Pulse 71   Temp 36.1 °C (96.9 °F)   Resp 18        Current Outpatient Medications:     amLODIPine (Norvasc) 10 mg tablet, Take 1 tablet (10 mg) by mouth once daily.,  Disp: , Rfl:     losartan (Cozaar) 50 mg tablet, Take 2 tablets (100 mg) by mouth once daily., Disp: , Rfl:     aspirin 81 mg EC tablet, Take 1 tablet (81 mg) by mouth once daily., Disp: , Rfl:     blood sugar diagnostic (OneTouch Verio test strips) strip, 1 strip once daily., Disp: , Rfl:     gabapentin (Neurontin) 100 mg capsule, Take 1 capsule (100 mg) by mouth 3 times a day as needed., Disp: , Rfl:     metFORMIN (Glucophage) 500 mg tablet, Take 1 tablet (500 mg) by mouth once daily with a meal., Disp: , Rfl:        Extensive review of notes in EMR, labs, tests, Interpretation of neuroimaging  MRI head results: MR brain wo IV contrast    Result Date: 6/17/2023  Interpreted By:  CHELLE LANIER DO and JAYSON RDZ MD MRN: [id number] Patient Name: ANSHUL DAO  STUDY: MRI BRAIN WO;  6/17/2023 1:55 am  INDICATION: episode of left eye blindness 6/15  for 4 min, Lie Flat: Yes, Pre Med: No .  COMPARISON: CT head and CTA head 06/16/2023.  ACCESSION NUMBER(S): [id number]  ORDERING CLINICIAN: JOYCE HILL  TECHNIQUE: Axial T2, FLAIR, DWI, gradient echo T2 and sagittal and coronal T1 weighted images of brain were acquired.  FINDINGS: Please note this exam is moderately degraded by motion artifact.  CSF Spaces: There is prominence of ventricles and cortical sulci likely secondary to a mild degree of generalized cerebral volume loss. No abnormal extra-axial collection.  Parenchyma: A punctate focus of increased DWI signal in the right precentral gyrus (series 4, image 47 of 76) without definite correlate on the ADC images may represent a small infarct versus T2 shine through. There is otherwise no evidence of diffusion restriction abnormality to suggest acute infarct. There is a mild degree of periventricular and subcortical white matter T2/FLAIR hyperintense signal changes which are nonspecific however may be secondary to chronic microvascular disease in a patient of this age. There is no mass effect or midline shift. No evidence  of intracranial hemorrhage.  Paranasal Sinuses and Mastoids: There is scattered ethmoid and left greater than right frontal air cell mucosal thickening. The mastoid air cells are clear.  Major intracranial flow voids are intact. Bilateral lens replacements are noted.      1. A punctate focus of increased DWI signal in the right precentral gyrus without definite correlate on the ADC images may represent a small infarct versus artifact. Otherwise, there is no evidence acute infarct, intracranial mass effect, or intracranial hemorrhage. 2. Mild degree of nonspecific T2/FLAIR hyperintense white matter changes presumed secondary to microvascular disease. Mild generalized cerebral volume loss.  I personally reviewed the images/study and I agree with the findings as stated. This study was interpreted at [facility], [place], [place]. , CT head results: CT head wo IV contrast    Result Date: 6/16/2023  Interpreted By:  DIOGENES HO MD MRN: [id number] Patient Name: ANSHUL DAO  STUDY: CT HEAD WO CONTRAST;  6/16/2023 6:53 pm  INDICATION: transient left occular vision loss yest for 4 min, Lie Flat: Yes .  COMPARISON: None.  ACCESSION NUMBER(S): [id number]  ORDERING CLINICIAN: JOYCE HILL  TECHNIQUE: Noncontrast enhanced CT was performed from the vertex to the skullbase. Multiplanar reconstructions were made.  FINDINGS: There is a normal size ventricular system. There is no evidence of intracranial mass or extra-axial collection. The skull base, paranasal sinuses and orbital structures are normal. The calvarium is normal.      Non contrast enhanced CT of the brain is within normal limits.  [place]AM[place] [facility] [place] Brain vessel imaging results: CT angio head w and wo IV contrast    Result Date: 6/16/2023  Interpreted By:  CHELLE LANIER DO MRN: [id number] Patient Name: ANSHUL DAO  STUDY: CT ANGIO HEAD W/O-W INCLUDE POST PROC; CT ANGIO NECK;  6/16/2023 9:28 pm; 6/16/2023  9:29 pm  INDICATION: L eye blindness, now resolved, Lie Flat: Yes .  COMPARISON: None.  ACCESSION NUMBER(S): [id number]; [id number]  ORDERING CLINICIAN: HARVEY SINGER  TECHNIQUE: 70 milliliter; 0 milliliter  OMNIPAQUE 350 was administered intravenously and axial images of the head and neck were acquired. Coronal, sagittal, and 3-D reconstructions were provided for review.  FINDINGS:   CTA HEAD FINDINGS:  Anterior circulation: Atherosclerotic calcifications of the bilateral carotid siphons with mild to moderate stenosis. Otherwise the bilateral intracranial internal carotid arteries, bilateral carotid terminals, bilateral proximal anterior and middle cerebral arteries are normal.  Posterior circulation: Bilateral intracranial vertebral arteries, vertebrobasilar junction, basilar artery and proximal posterior cerebral arteries are normal.  CTA NECK FINDINGS:  Right carotid vessels: Atherosclerotic calcifications of the common carotid artery with moderate stenosis. Atherosclerotic calcifications of the carotid bulb and right cervical ICA. Up to 30% stenosis by NASCET criteria.  Left carotid vessels: Atherosclerotic calcifications of the left common carotid artery, carotid bulb and ICA without significant stenosis. there is 0% stenosis  by NASCET criteria.  Vertebral vessels: Atherosclerotic calcifications along the bilateral vertebral origins with moderate to severe focal stenosis. Otherwise the visualized segments of the cervical vertebral arteries are normal in caliber.  Atherosclerotic calcifications of the visualized aortic arch. Common origin of the brachiocephalic and left common carotid artery, a normal anatomic branch pattern noted.  Heterogeneous enlarged left thyroid with substernal extension. A cystic mass measuring up to 3 cm. Heterogeneous peripheral left upper thyroid nodules extend along the anterior thyroid cartilage measuring 1.8 and 1.6 cm.  Marked degenerative changes of the cervical spine with  prominent anterior osteophytes. Chronic appearing degenerative height loss from C3 through C6. 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      Bilateral atherosclerotic calcifications of the cervical carotid arteries with up to 30% stenosis by NASCET criteria on the right.  Calcifications with moderate to severe focal stenosis involving the origins of the bilateral V1 segments of the vertebral arteries. Otherwise no evidence for significant stenosis of the cervical vessels.  Atherosclerotic calcifications with mild to moderate stenosis in the bilateral carotid siphons. No evidence for significant stenosis or large branch vessel cutoffs of the intracranial vessels.  Significant enlargement and heterogeneous appearance of the left lobe of the thyroid with substernal extension and adjacent heterogeneous enhancing masses. Malignancy not excluded. Follow-up ultrasound recommended.  Degenerative changes of the cervical spine including ossification of the posterior longitudinal ligament.    CT angio neck w and wo IV contrast    Result Date: 6/16/2023  Interpreted By:  CHELLE LANIER DO MRN: [id number] Patient Name: ANSHUL DAO  STUDY: CT ANGIO HEAD W/O-W INCLUDE POST PROC; CT ANGIO NECK;  6/16/2023 9:28 pm; 6/16/2023 9:29 pm  INDICATION: L eye blindness, now resolved, Lie Flat: Yes .  COMPARISON: None.  ACCESSION NUMBER(S): [id number]; [id number]  ORDERING CLINICIAN: HARVEY SINGER  TECHNIQUE: 70 milliliter; 0 milliliter  OMNIPAQUE 350 was administered intravenously and axial images of the head and neck were acquired. Coronal, sagittal, and 3-D reconstructions were provided for review.  FINDINGS:   CTA HEAD FINDINGS:  Anterior circulation: Atherosclerotic calcifications of the bilateral carotid siphons with mild to moderate stenosis. Otherwise the bilateral intracranial internal carotid arteries, bilateral carotid terminals, bilateral proximal anterior and middle cerebral  arteries are normal.  Posterior circulation: Bilateral intracranial vertebral arteries, vertebrobasilar junction, basilar artery and proximal posterior cerebral arteries are normal.  CTA NECK FINDINGS:  Right carotid vessels: Atherosclerotic calcifications of the common carotid artery with moderate stenosis. Atherosclerotic calcifications of the carotid bulb and right cervical ICA. Up to 30% stenosis by NASCET criteria.  Left carotid vessels: Atherosclerotic calcifications of the left common carotid artery, carotid bulb and ICA without significant stenosis. there is 0% stenosis  by NASCET criteria.  Vertebral vessels: Atherosclerotic calcifications along the bilateral vertebral origins with moderate to severe focal stenosis. Otherwise the visualized segments of the cervical vertebral arteries are normal in caliber.  Atherosclerotic calcifications of the visualized aortic arch. Common origin of the brachiocephalic and left common carotid artery, a normal anatomic branch pattern noted.  Heterogeneous enlarged left thyroid with substernal extension. A cystic mass measuring up to 3 cm. Heterogeneous peripheral left upper thyroid nodules extend along the anterior thyroid cartilage measuring 1.8 and 1.6 cm.  Marked degenerative changes of the cervical spine with prominent anterior osteophytes. Chronic appearing degenerative height loss from C3 through C6. 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      Bilateral atherosclerotic calcifications of the cervical carotid arteries with up to 30% stenosis by NASCET criteria on the right.  Calcifications with moderate to severe focal stenosis involving the origins of the bilateral V1 segments of the vertebral arteries. Otherwise no evidence for significant stenosis of the cervical vessels.  Atherosclerotic calcifications with mild to moderate stenosis in the bilateral carotid siphons. No evidence for significant stenosis or  large branch vessel cutoffs of the intracranial vessels.  Significant enlargement and heterogeneous appearance of the left lobe of the thyroid with substernal extension and adjacent heterogeneous enhancing masses. Malignancy not excluded. Follow-up ultrasound recommended.  Degenerative changes of the cervical spine including ossification of the posterior longitudinal ligament.       Neurologic Exam:    Mental Status: Oriented to person, place, and time. Language is fluent. Naming intact to low and high frequency objects. Repetition intact. Follows complex, cross body commands.   Cranial Nerves:   II: Pupils equal and reactive without evidence of APD. Visual fields are full to finger counting.   III/IV/VI: EOMI without nystagmus.   V: Facial sensation intact and symmetric. Strong muscles of mastication.   VII: Face symmetric without evidence of facial droop.   VIII: Hearing intact bilaterally to finger rub.  IX: Palate elevates symmetrically. Uvula midline.  XII: Tongue protrudes midline   Motor: Bulk is normal. Tone is normal. No pronator drift. No orbiting. Formal strength testing reveals 5/5 strength in bilateral upper and lower extremities. No abnormal movements.  Sensation: Sensation is intact and symmetric to light touch in bilateral upper and lower extremities. Negative Romberg.  Coordination: No evidence of ataxia in upper or lower extremities.   Gait: Walks with assistive device (cane). Narrow based gait that is steady.     Assessment/Plan:  83 year old with pre-diabetes, HLD, HTN, CKD 3 who presents for follow up after hospitalization for likely CRAO and small R frontal lobe stroke. Etiology likely cardioembolic due to no carotid disease and mildly dilated LA. After much discussion, she is willing to take Asa 81mg in the meantime while we undergo further investigation for afib with a loop recorder. She is resistant to starting a statin even at a lower dose due to previous intolerance. We will increase her  hydralazine due to uncontrolled HTN at today's visit. Finally, we discussed her thyroid findings on CTA and she will follow these up with PCP.     Increase hydralazine to 50mg BID  2.   Patient declined statin although we recommend to have LDL <70  3.   Recommend follow up of thyroid based on CTA  4.   Start ASA 81mg daily  5.   Recommend loop recorder   6.   Follow up in 3 months    Patient was seen and discussed with Dr. Marquis.     Pinky Carbone DO  Vascular Neurology Fellow PGY5  [facility]

## 2023-10-13 NOTE — PATIENT INSTRUCTIONS
Increase your hydralazine to 50mg twice a day  Start aspirin 81mg daily  Continue your other blood pressure medications  We will talk to your cardiologist about a loop recorder

## 2023-10-19 ENCOUNTER — APPOINTMENT (OUTPATIENT)
Dept: CARDIOLOGY | Facility: CLINIC | Age: 83
End: 2023-10-19
Payer: MEDICARE

## 2023-10-23 ENCOUNTER — PROCEDURE VISIT (OUTPATIENT)
Dept: PODIATRY | Facility: CLINIC | Age: 83
End: 2023-10-23
Payer: MEDICARE

## 2023-10-23 ENCOUNTER — APPOINTMENT (OUTPATIENT)
Dept: PODIATRY | Facility: CLINIC | Age: 83
End: 2023-10-23
Payer: MEDICARE

## 2023-10-23 DIAGNOSIS — M79.671 BILATERAL LEG AND FOOT PAIN: ICD-10-CM

## 2023-10-23 DIAGNOSIS — E11.9 ENCOUNTER FOR DIABETIC FOOT EXAM (MULTI): ICD-10-CM

## 2023-10-23 DIAGNOSIS — E11.9 DIABETES MELLITUS TYPE 2 WITHOUT RETINOPATHY (MULTI): Primary | ICD-10-CM

## 2023-10-23 DIAGNOSIS — M79.672 BILATERAL LEG AND FOOT PAIN: ICD-10-CM

## 2023-10-23 DIAGNOSIS — M79.604 BILATERAL LEG AND FOOT PAIN: ICD-10-CM

## 2023-10-23 DIAGNOSIS — M79.605 BILATERAL LEG AND FOOT PAIN: ICD-10-CM

## 2023-10-23 PROCEDURE — 99213 OFFICE O/P EST LOW 20 MIN: CPT | Performed by: PODIATRIST

## 2023-10-23 NOTE — PROGRESS NOTES
This is a 83 y.o. female est patient for foot exam    History of Present Illness:   Patient states they are here for Dm exam  Denies NTB to feet  Concerns of swelling in feet  No new pain noted  Last visit Oct 2022  Most recent A1C is 6.3  nable to safely trim nails on own      Past Medical History  Past Medical History:   Diagnosis Date    Achilles tendinitis, right leg 05/30/2019    Achilles tendinitis of both lower extremities    Acute upper respiratory infection, unspecified 03/06/2019    Acute URI    Cervicalgia 03/11/2021    Cervical spine pain    Contusion of right elbow, initial encounter 10/16/2020    Contusion of right elbow, initial encounter    Contusion of right wrist, initial encounter 10/11/2020    Contusion of right wrist, initial encounter    Effusion, unspecified knee 06/25/2020    Knee swelling    Effusion, unspecified knee 02/04/2021    Knee swelling    Encounter for examination of eyes and vision without abnormal findings 04/02/2020    Eye exam, routine    Encounter for general adult medical examination without abnormal findings 09/18/2020    Medicare annual wellness visit, subsequent    Fracture of tooth (traumatic), initial encounter for closed fracture 09/09/2020    Fractured tooth    Fracture of unspecified carpal bone, right wrist, initial encounter for closed fracture 11/16/2020    Closed fracture of right wrist, initial encounter    History of falling     History of fall    Impacted cerumen, bilateral 08/27/2020    Impacted cerumen of both ears    Lumbago with sciatica, left side 08/20/2020    Low back pain with left-sided sciatica, unspecified back pain laterality, unspecified chronicity    Lumbago with sciatica, left side 07/08/2021    Chronic bilateral low back pain with bilateral sciatica    Lumbago with sciatica, unspecified side 09/13/2020    Acute left-sided low back pain with sciatica, sciatica laterality unspecified    Otalgia, left ear 04/23/2020    Left ear pain    Other  abnormalities of gait and mobility 06/27/2019    Impaired gait and mobility    Other enthesopathies, not elsewhere classified 11/25/2020    Tendinitis of right wrist    Other general symptoms and signs 05/22/2019    Cold intolerance    Other infective otitis externa, left ear 03/02/2020    Infective otitis externa of left ear    Other specified cough 03/06/2019    Cough with sputum    Other specified soft tissue disorders 03/26/2018    Foot swelling    Otitis media, unspecified, left ear 02/09/2020    Acute left otitis media    Pain in left knee 11/11/2019    Acute pain of left knee    Pain in right ankle and joints of right foot 03/26/2018    Acute right ankle pain    Pain in right elbow 11/12/2020    Elbow pain, right    Pain in right hip 11/21/2019    Right hip pain    Pain in right knee 11/11/2019    Acute pain of right knee    Pain in right wrist 03/11/2021    Pain and swelling of right wrist    Personal history of (healed) traumatic fracture 10/17/2020    History of fracture of wrist    Personal history of diseases of the blood and blood-forming organs and certain disorders involving the immune mechanism 05/17/2017    History of iron deficiency anemia    Personal history of other diseases of the circulatory system     History of hypertension    Personal history of other diseases of the musculoskeletal system and connective tissue 03/11/2021    History of neck pain    Personal history of other diseases of the musculoskeletal system and connective tissue     History of arthritis    Personal history of other diseases of the nervous system and sense organs 08/28/2020    History of chronic otitis media    Personal history of other endocrine, nutritional and metabolic disease 10/16/2021    History of goiter    Personal history of other endocrine, nutritional and metabolic disease 10/16/2021    History of goiter    Personal history of other endocrine, nutritional and metabolic disease 07/08/2021    History of  hyperglycemia    Personal history of other endocrine, nutritional and metabolic disease 07/08/2021    History of vitamin D deficiency    Personal history of other infectious and parasitic diseases 02/09/2021    History of herpes zoster    Personal history of other mental and behavioral disorders 08/05/2017    History of anxiety    Personal history of other specified conditions 04/21/2021    History of chronic fatigue    Personal history of other specified conditions 04/21/2021    History of fatigue    Personal history of other specified conditions 08/05/2017    History of insomnia    Persons encountering health services in other specified circumstances 03/11/2021    Encounter to establish care with new doctor    Prediabetes 10/13/2021    Prediabetes    Stiffness of left hand, not elsewhere classified 11/12/2020    Morning joint stiffness of hand, left    Stiffness of right shoulder, not elsewhere classified     Decreased range of motion of right shoulder    Type 2 diabetes mellitus without complications (CMS/[facility]) 07/23/2021    Controlled type 2 diabetes mellitus without complication, without long-term current use of insulin    Ulnar collateral ligament sprain of right elbow, subsequent encounter 10/27/2020    Sprain of ulnar collateral ligament of right elbow, subsequent encounter    Unilateral primary osteoarthritis, right knee 03/11/2021    Primary osteoarthritis of right knee    Unspecified injury of right wrist, hand and finger(s), subsequent encounter 11/25/2020    Injury of right scapholunate ligament with no instability, subsequent encounter    Unspecified injury of unspecified hip, initial encounter 05/04/2015    Hip injury    Unspecified injury of unspecified wrist, hand and finger(s), initial encounter 10/16/2020    Wrist injury    Unspecified injury of unspecified wrist, hand and finger(s), initial encounter 05/04/2015    Hand injury    Unspecified multiple injuries, initial encounter 05/17/2017     Contusion, multiple sites    Unspecified sprain of right wrist, initial encounter 11/16/2020    Right wrist sprain       Medications and Allergies have been reviewed.    Review Of Systems:  GENERAL: No weight loss, malaise or fevers.  HEENT: Negative for frequent or significant headaches,   RESPIRATORY: Negative for cough, wheezing or shortness of breath.  CARDIOVASCULAR: Negative for chest pain, leg swelling or palpitations.    Physical Exam:  Patient is a pleasant, cooperative, well developed 83 y.o.  adult female. The patient is alert and oriented to time, place and person.   Patient has normal affect and mood.    Examination of Both Lower Extremities:   Objective:   Vasc: DP and PT pulses are palpable bilateral.  CFT is less than 3 seconds bilateral.  Skin temperature is warm to cool proximal to distal bilateral.  +Varicosities noted No swelling noted.     Neuro: Vibratory, light touch and proprioception are intact bilateral.    Derm: Nails 1-5 bilateral are intact and non elongated    Skin is supple with normal texture and turgor noted.  Webspaces are clean, dry and intact bilateral.      Ortho: Muscle strength is 5/5 for all pedal groups tested.  Decreased ROM to b/l 1st MTPJ    A comprehensive history and physical exam was performed. The patient was educated on clinical and radiographic findings, diagnosis, and treatment plans.    1. Diabetes mellitus type 2 without retinopathy (CMS/[facility])        2. Bilateral leg and foot pain        3. Encounter for diabetic foot exam (CMS/[facility])              Patient educated on proper diabetic foot care.  A1C revd  Discussed swelling, not concerns noted.   Discussed how to care for feet.   Fu yearly   Sooner if any new prob arise.     Patient was in agreement to this plan. All questions answered.      Geovanna Tijerina DPM  [phone]  [facility]  Fax: [phone]

## 2023-10-26 ENCOUNTER — OFFICE VISIT (OUTPATIENT)
Dept: NEPHROLOGY | Facility: CLINIC | Age: 83
End: 2023-10-26
Payer: MEDICARE

## 2023-10-26 VITALS
BODY MASS INDEX: 22.49 KG/M2 | WEIGHT: 135 LBS | HEART RATE: 66 BPM | SYSTOLIC BLOOD PRESSURE: 151 MMHG | DIASTOLIC BLOOD PRESSURE: 59 MMHG | HEIGHT: 65 IN

## 2023-10-26 DIAGNOSIS — I12.9 HYPERTENSIVE CHRONIC KIDNEY DISEASE WITH STAGE 1 THROUGH STAGE 4 CHRONIC KIDNEY DISEASE, OR UNSPECIFIED CHRONIC KIDNEY DISEASE: Primary | ICD-10-CM

## 2023-10-26 PROCEDURE — 1126F AMNT PAIN NOTED NONE PRSNT: CPT | Performed by: INTERNAL MEDICINE

## 2023-10-26 PROCEDURE — 3077F SYST BP >= 140 MM HG: CPT | Performed by: INTERNAL MEDICINE

## 2023-10-26 PROCEDURE — 3078F DIAST BP <80 MM HG: CPT | Performed by: INTERNAL MEDICINE

## 2023-10-26 PROCEDURE — 1160F RVW MEDS BY RX/DR IN RCRD: CPT | Performed by: INTERNAL MEDICINE

## 2023-10-26 PROCEDURE — 99214 OFFICE O/P EST MOD 30 MIN: CPT | Performed by: INTERNAL MEDICINE

## 2023-10-26 PROCEDURE — 1036F TOBACCO NON-USER: CPT | Performed by: INTERNAL MEDICINE

## 2023-10-26 PROCEDURE — 1159F MED LIST DOCD IN RCRD: CPT | Performed by: INTERNAL MEDICINE

## 2023-10-26 ASSESSMENT — PAIN SCALES - GENERAL: PAINLEVEL: 0-NO PAIN

## 2023-10-26 NOTE — PATIENT INSTRUCTIONS
Please check home blood pressure morning and evening for 7 days and call those readings to out office .  Continue blood pressure medications as are for now.  I am ordering an kidney ultrasound to the blood vessels of the kidneys to make sure that is not what caused the high blood pressure  Follow up with the heart doctor as scheduled.  I will see you back in about 4 months

## 2023-10-27 NOTE — PROGRESS NOTES
"Surekha Lorenzo is a 83 y.o. female here in follow up for CKD    Subjective   Hospitalized at [place] with uncontrolled HTN recently. Renin and carrie unremarkable.  Since discharge followed up with cardiology and neurology. Her hydralazine increased to 50 mg BID , which she thinks is too much. On the other hand she wants her BP to be better controlled. Discussed need to continue medications and low salt diet. Also asked to obtain home BP check am and pm for 7 days and send/call those readings to us. Reports having home sphygmomanometer validated since last visit.  She has no chest pain, shortness of breath, leg edema, dysuria or hematuria.       ROS  All the rest reviewed and negative.     Objective     Vital signs    /59   Pulse 66   Ht 1.651 m (5' 5\")   Wt 61.2 kg (135 lb)   BMI 22.47 kg/m²     Physical Exam  Constitutional:  well appearing, in NAD  Psychiatric:  appropriate mood and behavior    HEENT: NC/AT, clear sclerae, moist mucous membranes  Cardiovascular: normal S1, S2, RRR,  no murmurs, gallop or rubs  Pulmonary:  Normal breath sounds  Extremities: no edema  Skin:  warm and dry    Meds    Current Outpatient Medications:     amLODIPine (Norvasc) 10 mg tablet, Take 1 tablet (10 mg) by mouth once daily., Disp: , Rfl:     aspirin 81 mg EC tablet, Take 1 tablet (81 mg) by mouth once daily., Disp: 30 tablet, Rfl: 1    hydrALAZINE (Apresoline) 50 mg tablet, Take 1 tablet (50 mg) by mouth 2 times a day., Disp: 60 tablet, Rfl: 2    losartan (Cozaar) 50 mg tablet, Take 2 tablets (100 mg) by mouth once daily., Disp: , Rfl:      Allergies  Allergies   Allergen Reactions    Prednisone Swelling and Unknown        Results     BUN  7 - 21 mg/dL 30 High    Creatinine  0.58 - 0.96 mg/dL 1.26 High    Sodium  136 - 144 mmol/L 141   Potassium  3.7 - 5.1 mmol/L 4.8   Chloride  97 - 105 mmol/L 106 High    CO2  22 - 30 mmol/L 25   Anion Gap  9 - 18 mmol/L 10   Estimated Glomerular Filtration Rate  >=60 mL/min/1.73m² 42 " Low       Specimen Collected: 09/15/23 04:35    Performed by: [facility] Last Resulted: 09/15/23 05:55       Assessment and Plan  Impression:  1.CKD stage 3a/A1, likely hypertensive nephrosclerosis or remote episode of BRIA. Serum creatinine last month stable at 1.26 rendering an eGFR of 42 ml/min/1.73m2.  2. Septated R renal cyst (1.5 cm)  3. HTN - BP above goal here today. Recent hospitalization for uncontrolled HTN. Meds as above.    Plan:   - continue antihypertensives consistently, as are for now  - low salt diet, exercise as able  - home BP check am and pm for 7 days and call readings to us. Adjust meds as appropriate afterwards.  - renal arteries vascular dopplers  - avoid NSAID         RTC in 4-6 months         Eileen Russell MD

## 2023-10-31 ENCOUNTER — OFFICE VISIT (OUTPATIENT)
Dept: OPHTHALMOLOGY | Facility: CLINIC | Age: 83
End: 2023-10-31
Payer: MEDICARE

## 2023-10-31 DIAGNOSIS — Z96.1 PSEUDOPHAKIA OF BOTH EYES: Primary | ICD-10-CM

## 2023-10-31 DIAGNOSIS — R73.03 BORDERLINE DIABETES MELLITUS: ICD-10-CM

## 2023-10-31 DIAGNOSIS — Z98.41 HISTORY OF YAG LASER CAPSULOTOMY OF LENS OF RIGHT EYE: ICD-10-CM

## 2023-10-31 PROCEDURE — 99024 POSTOP FOLLOW-UP VISIT: CPT | Performed by: OPHTHALMOLOGY

## 2023-10-31 ASSESSMENT — REFRACTION_MANIFEST
OD_CYLINDER: -1.50
OD_CYLINDER: -1.50
OD_SPHERE: -2.50
OS_CYLINDER: -0.75
OD_SPHERE: -2.00
OS_SPHERE: -1.75
OS_CYLINDER: -0.75
OS_ADD: +2.50
METHOD_AUTOREFRACTION: 1
OD_AXIS: 090
OS_AXIS: 095
OD_ADD: +2.50
OD_AXIS: 085
OS_AXIS: 095
OS_SPHERE: -1.50

## 2023-10-31 ASSESSMENT — VISUAL ACUITY
OD_CC: 20/20
OD_CC+: -1
OS_CC: 20/20
CORRECTION_TYPE: GLASSES
METHOD: SNELLEN - LINEAR

## 2023-10-31 ASSESSMENT — ENCOUNTER SYMPTOMS
HEMATOLOGIC/LYMPHATIC NEGATIVE: 0
MUSCULOSKELETAL NEGATIVE: 0
CARDIOVASCULAR NEGATIVE: 0
RESPIRATORY NEGATIVE: 0
ALLERGIC/IMMUNOLOGIC NEGATIVE: 0
CONSTITUTIONAL NEGATIVE: 0
ENDOCRINE NEGATIVE: 0
GASTROINTESTINAL NEGATIVE: 0
PSYCHIATRIC NEGATIVE: 0
EYES NEGATIVE: 0
NEUROLOGICAL NEGATIVE: 0

## 2023-10-31 ASSESSMENT — EXTERNAL EXAM - LEFT EYE: OS_EXAM: NORMAL

## 2023-10-31 ASSESSMENT — SLIT LAMP EXAM - LIDS
COMMENTS: NORMAL
COMMENTS: NORMAL

## 2023-10-31 ASSESSMENT — TONOMETRY
OS_IOP_MMHG: 17
OD_IOP_MMHG: 15
IOP_METHOD: TONOPEN

## 2023-10-31 ASSESSMENT — EXTERNAL EXAM - RIGHT EYE: OD_EXAM: NORMAL

## 2023-10-31 NOTE — PROGRESS NOTES
Status post (s/p) YAG caps right eye (OD)  - 9/2023  -had episode of complete vision loss right eye (OD), one week later, lasted for 5-10 min, was seen in urgent eye clinic, no abnormalities detected, recommended to see urgent care for TIA jillian, patient deferred    Today: no more visual problems, was seeing text/images after laser, which is gone    2. Pseudophakia OU    at [facility] 10 years ago   ok with ADL`s not vis sig   monitor     3. Myopia  New Mrx     Not assessed today  4. DM 2 without DR   diagnosed 6 months ago, now borderline   last A1c 6.2  Not on any medication currently   no retinopathy or edema   educated patient on good BG and BP and lipid control  Finding a new PCP

## 2023-11-01 ENCOUNTER — TELEMEDICINE (OUTPATIENT)
Dept: CARDIOLOGY | Facility: CLINIC | Age: 83
End: 2023-11-01
Payer: MEDICARE

## 2023-11-01 DIAGNOSIS — R07.89 OTHER CHEST PAIN: ICD-10-CM

## 2023-11-01 DIAGNOSIS — R00.1 BRADYCARDIA: ICD-10-CM

## 2023-11-01 DIAGNOSIS — I10 PRIMARY HYPERTENSION: Primary | ICD-10-CM

## 2023-11-01 PROCEDURE — 99214 OFFICE O/P EST MOD 30 MIN: CPT

## 2023-11-01 PROCEDURE — 99214 OFFICE O/P EST MOD 30 MIN: CPT | Mod: 95

## 2023-11-01 RX ORDER — HYDRALAZINE HYDROCHLORIDE 50 MG/1
50 TABLET, FILM COATED ORAL 2 TIMES DAILY
Qty: 60 TABLET | Refills: 2 | Status: SHIPPED | OUTPATIENT
Start: 2023-11-01 | End: 2023-12-05 | Stop reason: DRUGHIGH

## 2023-11-01 RX ORDER — LOSARTAN POTASSIUM 100 MG/1
100 TABLET ORAL DAILY
Qty: 30 TABLET | Refills: 11 | Status: SHIPPED | OUTPATIENT
Start: 2023-11-01 | End: 2024-10-31

## 2023-11-01 NOTE — PROGRESS NOTES
Chief Complaint:   Televisit . Patient had questions regarding cardiac testing and medications.     History Of Present Illness:    Mrs. Surekha Lorenzo is a 83-year-old -American female with a past medical history of hypertension, CKD stage III, hyperlipidemia and heart failure preserved EF 65-70%, left ventricular cavity size is decreased on echocardiogram March 20, 2022. Today we have a tele-visit to discuss medications and cardiac testing.    Patient wanted  detailed explanation regarding stress testing. It was explained to her that NST was ordered for chest pain as well as to quantify chronotropic incompetency. She also questioned being on amlodipine and requested to stop d/t  reading the grape fruit should not be eaten when on amlodipine.  Pt denies shortness of breath, dyspnea on exertion, orthopnea, and paroxysmal nocturnal dyspnea.  Pt denies worsening lower extremity edema.  Pt denies palpitations or syncope.  No recent falls.  No fever or chills.  No cough.  No change in bowel or bladder habits.  No sick contacts.  No recent travel.     Past Medical History:  She has a past medical history of Achilles tendinitis, right leg (05/30/2019), Acute upper respiratory infection, unspecified (03/06/2019), Cervicalgia (03/11/2021), Contusion of right elbow, initial encounter (10/16/2020), Contusion of right wrist, initial encounter (10/11/2020), Effusion, unspecified knee (06/25/2020), Effusion, unspecified knee (02/04/2021), Encounter for examination of eyes and vision without abnormal findings (04/02/2020), Encounter for general adult medical examination without abnormal findings (09/18/2020), Fracture of tooth (traumatic), initial encounter for closed fracture (09/09/2020), Fracture of unspecified carpal bone, right wrist, initial encounter for closed fracture (11/16/2020), History of falling, Impacted cerumen, bilateral (08/27/2020), Lumbago with sciatica, left side (08/20/2020), Lumbago with sciatica, left  side (07/08/2021), Lumbago with sciatica, unspecified side (09/13/2020), Otalgia, left ear (04/23/2020), Other abnormalities of gait and mobility (06/27/2019), Other enthesopathies, not elsewhere classified (11/25/2020), Other general symptoms and signs (05/22/2019), Other infective otitis externa, left ear (03/02/2020), Other specified cough (03/06/2019), Other specified soft tissue disorders (03/26/2018), Otitis media, unspecified, left ear (02/09/2020), Pain in left knee (11/11/2019), Pain in right ankle and joints of right foot (03/26/2018), Pain in right elbow (11/12/2020), Pain in right hip (11/21/2019), Pain in right knee (11/11/2019), Pain in right wrist (03/11/2021), Personal history of (healed) traumatic fracture (10/17/2020), Personal history of diseases of the blood and blood-forming organs and certain disorders involving the immune mechanism (05/17/2017), Personal history of other diseases of the circulatory system, Personal history of other diseases of the musculoskeletal system and connective tissue (03/11/2021), Personal history of other diseases of the musculoskeletal system and connective tissue, Personal history of other diseases of the nervous system and sense organs (08/28/2020), Personal history of other endocrine, nutritional and metabolic disease (10/16/2021), Personal history of other endocrine, nutritional and metabolic disease (10/16/2021), Personal history of other endocrine, nutritional and metabolic disease (07/08/2021), Personal history of other endocrine, nutritional and metabolic disease (07/08/2021), Personal history of other infectious and parasitic diseases (02/09/2021), Personal history of other mental and behavioral disorders (08/05/2017), Personal history of other specified conditions (04/21/2021), Personal history of other specified conditions (04/21/2021), Personal history of other specified conditions (08/05/2017), Persons encountering health services in other specified  circumstances (03/11/2021), Prediabetes (10/13/2021), Stiffness of left hand, not elsewhere classified (11/12/2020), Stiffness of right shoulder, not elsewhere classified, Type 2 diabetes mellitus without complications (CMS/[facility]) (07/23/2021), Ulnar collateral ligament sprain of right elbow, subsequent encounter (10/27/2020), Unilateral primary osteoarthritis, right knee (03/11/2021), Unspecified injury of right wrist, hand and finger(s), subsequent encounter (11/25/2020), Unspecified injury of unspecified hip, initial encounter (05/04/2015), Unspecified injury of unspecified wrist, hand and finger(s), initial encounter (10/16/2020), Unspecified injury of unspecified wrist, hand and finger(s), initial encounter (05/04/2015), Unspecified multiple injuries, initial encounter (05/17/2017), and Unspecified sprain of right wrist, initial encounter (11/16/2020).    Past Surgical History:  She has a past surgical history that includes Other surgical history (09/18/2020); CT angio head w and wo IV contrast (6/16/2023); and CT angio neck w and wo IV contrast (6/16/2023).      Social History:  She has no history on file for tobacco use, alcohol use, and drug use.    Family History:  Family History   Problem Relation Name Age of Onset    Hypertension Other          Allergies:  Prednisone    Outpatient Medications:  Current Outpatient Medications   Medication Instructions    amLODIPine (NORVASC) 10 mg, oral, Daily    aspirin 81 mg, oral, Daily    hydrALAZINE (APRESOLINE) 50 mg, oral, 2 times daily    losartan (COZAAR) 100 mg, oral, Daily        Last Labs:  CBC -  Lab Results   Component Value Date    WBC 3.4 (L) 06/16/2023    HGB 11.9 (L) 06/16/2023    HCT 35.5 (L) 06/16/2023    MCV 88 06/16/2023     06/16/2023       CMP -  Lab Results   Component Value Date    CALCIUM 9.6 06/16/2023    PHOS 3.1 04/10/2023    PROT 7.0 06/16/2023    ALBUMIN 4.2 06/16/2023    AST 15 06/16/2023    ALT 7 06/16/2023    ALKPHOS 60 06/16/2023     BILITOT 0.4 06/16/2023       LIPID PANEL -   Lab Results   Component Value Date    CHOL 194 06/16/2023    TRIG 75 06/16/2023    HDL 79.1 06/16/2023    CHHDL 2.5 06/16/2023    LDLF 100 (H) 06/16/2023    VLDL 15 06/16/2023       RENAL FUNCTION PANEL -   Lab Results   Component Value Date    GLUCOSE 86 06/16/2023     06/16/2023    K 4.1 06/16/2023     06/16/2023    CO2 28 06/16/2023    ANIONGAP 11 06/16/2023    BUN 21 06/16/2023    CREATININE 1.22 (H) 06/16/2023    CALCIUM 9.6 06/16/2023    PHOS 3.1 04/10/2023    ALBUMIN 4.2 06/16/2023        Lab Results   Component Value Date    BNP 63 06/16/2023    HGBA1C 6.3 (A) 06/16/2023       Assessment/Plan   Mrs. Surekha Lorenzo is a 83-year-old -American female with a past medical history of hypertension, CKD stage III, hyperlipidemia and heart failure preserved EF 65-70%, left ventricular cavity size is decreased on echocardiogram March 20, 2022. Today we have a tele-visit to discuss medications and cardiac testing.    Patient wanted  detailed explanation regarding stress testing. It was explained to her that NST was ordered for chest pain as well as to quantify chronotropic incompetency. She also questioned being on amlodipine and requested to stop d/t  reading the grape fruit should not be eaten when on amlodipine.     Since she will be stopping amlodipine Losartan was increased from 50 mg daily to 100 mg daily. She was asked to check BP daily 2 hours after taking medication and reports if  BP is above 130/80.    Follow up scheduled after NST      HAYDEN Cabrales    An interactive audio and video telecommunication system which permits real time communications between the patient (at the originating site) and provider (at the distant site) was utilized to provide this telehealth service. Verbal consent was requested and obtained from the patient on this date for a telehealth visit. Time spent with patient was 30 minutes of which greater than 50  percent was spent counseling and coordinating care.

## 2023-11-02 ENCOUNTER — DOCUMENTATION (OUTPATIENT)
Dept: SPEECH THERAPY | Facility: CLINIC | Age: 83
End: 2023-11-02
Payer: MEDICARE

## 2023-11-02 NOTE — PROGRESS NOTES
Speech-Language Pathology                 Therapy Communication Note    Patient Name: Surekha Lorenzo  MRN: [id number]  Today's Date: 11/2/2023     Discipline: Speech Language Pathology    Missed Visit Reason:  Patient did not arrive at her scheduled speech therapy session this date. There was no call to the office to cancel.     Missed Time: No Show    Comment:

## 2023-11-06 ENCOUNTER — OFFICE VISIT (OUTPATIENT)
Dept: CARDIOLOGY | Facility: CLINIC | Age: 83
End: 2023-11-06
Payer: MEDICARE

## 2023-11-06 VITALS
BODY MASS INDEX: 20.89 KG/M2 | OXYGEN SATURATION: 96 % | WEIGHT: 130 LBS | SYSTOLIC BLOOD PRESSURE: 138 MMHG | HEART RATE: 78 BPM | HEIGHT: 66 IN | DIASTOLIC BLOOD PRESSURE: 60 MMHG

## 2023-11-06 DIAGNOSIS — I10 PRIMARY HYPERTENSION: Primary | ICD-10-CM

## 2023-11-06 PROCEDURE — 1160F RVW MEDS BY RX/DR IN RCRD: CPT

## 2023-11-06 PROCEDURE — 99213 OFFICE O/P EST LOW 20 MIN: CPT

## 2023-11-06 PROCEDURE — 1126F AMNT PAIN NOTED NONE PRSNT: CPT

## 2023-11-06 PROCEDURE — 1159F MED LIST DOCD IN RCRD: CPT

## 2023-11-06 PROCEDURE — 3075F SYST BP GE 130 - 139MM HG: CPT

## 2023-11-06 PROCEDURE — 3078F DIAST BP <80 MM HG: CPT

## 2023-11-06 PROCEDURE — 1036F TOBACCO NON-USER: CPT

## 2023-11-06 NOTE — PATIENT INSTRUCTIONS
Surekha,    No medication changes    Nuclear Exercise Stress test schedule    Follow up in 3 months    Yue DEWEY-CHELSIE

## 2023-11-06 NOTE — PROGRESS NOTES
"Chief Complaint:   Hypertension     History Of Present Illness:    Mrs. Surekha Lorenzo is a 83-year-old -American female with a past medical history of hypertension, CKD stage III, hyperlipidemia and heart failure preserved EF 65-70%, left ventricular cavity size is decreased on echocardiogram March 20, 2022. Today patient is here for HTN.     Patient reports to waking up at 2 am this morning and checking BP and it was 200/90. She reports, \" I always check my blood pressure\". She was not symptomatic and had forgotten to take PM hydralazine dose. She has had some difficulty with scheduling stress test. Previously she was having chest pain. She reports that it has not been occurring as often as before but still would like to proceed with stress test. HR has been up in the 70-80s as oppose to previously when it was in the 50's.  Pt denies shortness of breath, dyspnea on exertion, orthopnea, and paroxysmal nocturnal dyspnea.  Pt denies worsening lower extremity edema.  Pt denies palpitations or syncope.  No recent falls.  No fever or chills.  No cough.  No change in bowel or bladder habits.  No sick contacts.  No recent travel.    Last Recorded Vitals:  Vitals:    11/06/23 1056   BP: 150/64   Pulse: 78   SpO2: 96%   Weight: 59 kg (130 lb)   Height: 1.664 m (5' 5.5\")     Vitals:    11/06/23 1125   BP: 138/60   Pulse:    SpO2:          Past Medical History:  She has a past medical history of Achilles tendinitis, right leg (05/30/2019), Acute upper respiratory infection, unspecified (03/06/2019), Cervicalgia (03/11/2021), Contusion of right elbow, initial encounter (10/16/2020), Contusion of right wrist, initial encounter (10/11/2020), Effusion, unspecified knee (06/25/2020), Effusion, unspecified knee (02/04/2021), Encounter for examination of eyes and vision without abnormal findings (04/02/2020), Encounter for general adult medical examination without abnormal findings (09/18/2020), Fracture of tooth (traumatic), " initial encounter for closed fracture (09/09/2020), Fracture of unspecified carpal bone, right wrist, initial encounter for closed fracture (11/16/2020), History of falling, Impacted cerumen, bilateral (08/27/2020), Lumbago with sciatica, left side (08/20/2020), Lumbago with sciatica, left side (07/08/2021), Lumbago with sciatica, unspecified side (09/13/2020), Otalgia, left ear (04/23/2020), Other abnormalities of gait and mobility (06/27/2019), Other enthesopathies, not elsewhere classified (11/25/2020), Other general symptoms and signs (05/22/2019), Other infective otitis externa, left ear (03/02/2020), Other specified cough (03/06/2019), Other specified soft tissue disorders (03/26/2018), Otitis media, unspecified, left ear (02/09/2020), Pain in left knee (11/11/2019), Pain in right ankle and joints of right foot (03/26/2018), Pain in right elbow (11/12/2020), Pain in right hip (11/21/2019), Pain in right knee (11/11/2019), Pain in right wrist (03/11/2021), Personal history of (healed) traumatic fracture (10/17/2020), Personal history of diseases of the blood and blood-forming organs and certain disorders involving the immune mechanism (05/17/2017), Personal history of other diseases of the circulatory system, Personal history of other diseases of the musculoskeletal system and connective tissue (03/11/2021), Personal history of other diseases of the musculoskeletal system and connective tissue, Personal history of other diseases of the nervous system and sense organs (08/28/2020), Personal history of other endocrine, nutritional and metabolic disease (10/16/2021), Personal history of other endocrine, nutritional and metabolic disease (10/16/2021), Personal history of other endocrine, nutritional and metabolic disease (07/08/2021), Personal history of other endocrine, nutritional and metabolic disease (07/08/2021), Personal history of other infectious and parasitic diseases (02/09/2021), Personal history of  other mental and behavioral disorders (08/05/2017), Personal history of other specified conditions (04/21/2021), Personal history of other specified conditions (04/21/2021), Personal history of other specified conditions (08/05/2017), Persons encountering health services in other specified circumstances (03/11/2021), Prediabetes (10/13/2021), Stiffness of left hand, not elsewhere classified (11/12/2020), Stiffness of right shoulder, not elsewhere classified, Type 2 diabetes mellitus without complications (CMS/[facility]) (07/23/2021), Ulnar collateral ligament sprain of right elbow, subsequent encounter (10/27/2020), Unilateral primary osteoarthritis, right knee (03/11/2021), Unspecified injury of right wrist, hand and finger(s), subsequent encounter (11/25/2020), Unspecified injury of unspecified hip, initial encounter (05/04/2015), Unspecified injury of unspecified wrist, hand and finger(s), initial encounter (10/16/2020), Unspecified injury of unspecified wrist, hand and finger(s), initial encounter (05/04/2015), Unspecified multiple injuries, initial encounter (05/17/2017), and Unspecified sprain of right wrist, initial encounter (11/16/2020).    Past Surgical History:  She has a past surgical history that includes Other surgical history (09/18/2020); CT angio head w and wo IV contrast (6/16/2023); and CT angio neck w and wo IV contrast (6/16/2023).      Social History:  She reports that she has never smoked. She has never used smokeless tobacco. No history on file for alcohol use and drug use.    Family History:  Family History   Problem Relation Name Age of Onset    Hypertension Other          Allergies:  Prednisone    Outpatient Medications:  Current Outpatient Medications   Medication Instructions    aspirin 81 mg, oral, Daily    hydrALAZINE (APRESOLINE) 50 mg, oral, 2 times daily    losartan (COZAAR) 100 mg, oral, Daily       Physical Exam  Constitutional:       Appearance: Normal appearance.   Neck:       "Vascular: No carotid bruit.   Cardiovascular:      Rate and Rhythm: Normal rate and regular rhythm.      Heart sounds: No murmur heard.  Pulmonary:      Effort: Pulmonary effort is normal.      Breath sounds: Normal breath sounds.   Abdominal:      Palpations: Abdomen is soft.   Skin:     General: Skin is warm.   Neurological:      Mental Status: She is alert and oriented to person, place, and time.   Psychiatric:         Mood and Affect: Mood normal.     Last Labs:  CBC -  Lab Results   Component Value Date    WBC 3.4 (L) 06/16/2023    HGB 11.9 (L) 06/16/2023    HCT 35.5 (L) 06/16/2023    MCV 88 06/16/2023     06/16/2023       CMP -  Lab Results   Component Value Date    CALCIUM 9.6 06/16/2023    PHOS 3.1 04/10/2023    PROT 7.0 06/16/2023    ALBUMIN 4.2 06/16/2023    AST 15 06/16/2023    ALT 7 06/16/2023    ALKPHOS 60 06/16/2023    BILITOT 0.4 06/16/2023       LIPID PANEL -   Lab Results   Component Value Date    CHOL 194 06/16/2023    TRIG 75 06/16/2023    HDL 79.1 06/16/2023    CHHDL 2.5 06/16/2023    LDLF 100 (H) 06/16/2023    VLDL 15 06/16/2023       RENAL FUNCTION PANEL -   Lab Results   Component Value Date    GLUCOSE 86 06/16/2023     06/16/2023    K 4.1 06/16/2023     06/16/2023    CO2 28 06/16/2023    ANIONGAP 11 06/16/2023    BUN 21 06/16/2023    CREATININE 1.22 (H) 06/16/2023    CALCIUM 9.6 06/16/2023    PHOS 3.1 04/10/2023    ALBUMIN 4.2 06/16/2023        Lab Results   Component Value Date    BNP 63 06/16/2023    HGBA1C 6.3 (A) 06/16/2023           Assessment/Plan   Mrs. Surekha Lorenzo is a 83-year-old -American female with a past medical history of hypertension, CKD stage III, hyperlipidemia and heart failure preserved EF 65-70%, left ventricular cavity size is decreased on echocardiogram March 20, 2022. Today patient is here for HTN.     Patient reports to waking up at 2 am this morning and checking BP and it was 200/90. She reports, \" I always check my blood pressure\". She was " not symptomatic and had forgotten to take PM hydralazine dose. She has had some difficulty with scheduling stress test. Previously   was having chest pain. She reports that it has not been occurring as often as before but still would like to proceed with stress test. HR has been up in the 70-80s as oppose to previously when it was in the 50's.  Repeat BP today was better controlled and at goal. BP machine was collaborated and accuracy was confirmed.    Will assist patient in scheduling Exercise Nuclear Stress Test.    Follow up in 3 months.    No medication changes    Yue Batista, APRN-CNP

## 2023-11-10 ENCOUNTER — HOSPITAL ENCOUNTER (OUTPATIENT)
Dept: RADIOLOGY | Facility: HOSPITAL | Age: 83
Discharge: HOME | End: 2023-11-10
Payer: MEDICARE

## 2023-11-10 ENCOUNTER — TELEPHONE (OUTPATIENT)
Dept: CARDIOLOGY | Facility: CLINIC | Age: 83
End: 2023-11-10

## 2023-11-10 ENCOUNTER — HOSPITAL ENCOUNTER (OUTPATIENT)
Dept: CARDIOLOGY | Facility: HOSPITAL | Age: 83
Discharge: HOME | End: 2023-11-10
Payer: MEDICARE

## 2023-11-10 DIAGNOSIS — R07.9 CHEST PAIN, UNSPECIFIED TYPE: ICD-10-CM

## 2023-11-10 PROCEDURE — 93016 CV STRESS TEST SUPVJ ONLY: CPT | Performed by: STUDENT IN AN ORGANIZED HEALTH CARE EDUCATION/TRAINING PROGRAM

## 2023-11-10 PROCEDURE — A9502 TC99M TETROFOSMIN: HCPCS

## 2023-11-10 PROCEDURE — 78452 HT MUSCLE IMAGE SPECT MULT: CPT | Performed by: STUDENT IN AN ORGANIZED HEALTH CARE EDUCATION/TRAINING PROGRAM

## 2023-11-10 PROCEDURE — 78452 HT MUSCLE IMAGE SPECT MULT: CPT

## 2023-11-10 PROCEDURE — 3430000001 HC RX 343 DIAGNOSTIC RADIOPHARMACEUTICALS

## 2023-11-10 PROCEDURE — 93018 CV STRESS TEST I&R ONLY: CPT | Performed by: STUDENT IN AN ORGANIZED HEALTH CARE EDUCATION/TRAINING PROGRAM

## 2023-11-10 PROCEDURE — 93017 CV STRESS TEST TRACING ONLY: CPT

## 2023-11-10 RX ADMIN — TETROFOSMIN 35 MILLICURIE: 0.23 INJECTION, POWDER, LYOPHILIZED, FOR SOLUTION INTRAVENOUS at 09:20

## 2023-11-10 RX ADMIN — TETROFOSMIN 11.6 MILLICURIE: 0.23 INJECTION, POWDER, LYOPHILIZED, FOR SOLUTION INTRAVENOUS at 07:43

## 2023-11-24 NOTE — ADDENDUM NOTE
Encounter addended by: Catherine Bermeo RN on: 11/24/2023 6:50 PM   Actions taken: Imaging Exam ended

## 2023-11-24 NOTE — ADDENDUM NOTE
Encounter addended by: Catherine Bermeo RN on: 11/24/2023 6:50 PM   Actions taken: Imaging Exam begun

## 2023-12-05 ENCOUNTER — OFFICE VISIT (OUTPATIENT)
Dept: CARDIOLOGY | Facility: CLINIC | Age: 83
End: 2023-12-05
Payer: MEDICARE

## 2023-12-05 VITALS
BODY MASS INDEX: 21.53 KG/M2 | SYSTOLIC BLOOD PRESSURE: 168 MMHG | WEIGHT: 134 LBS | HEART RATE: 54 BPM | HEIGHT: 66 IN | OXYGEN SATURATION: 94 % | DIASTOLIC BLOOD PRESSURE: 72 MMHG

## 2023-12-05 DIAGNOSIS — I10 PRIMARY HYPERTENSION: Primary | ICD-10-CM

## 2023-12-05 DIAGNOSIS — R94.39 ABNORMAL NUCLEAR STRESS TEST: ICD-10-CM

## 2023-12-05 PROCEDURE — 99214 OFFICE O/P EST MOD 30 MIN: CPT

## 2023-12-05 PROCEDURE — 1160F RVW MEDS BY RX/DR IN RCRD: CPT

## 2023-12-05 PROCEDURE — 3078F DIAST BP <80 MM HG: CPT

## 2023-12-05 PROCEDURE — 3077F SYST BP >= 140 MM HG: CPT

## 2023-12-05 PROCEDURE — 1036F TOBACCO NON-USER: CPT

## 2023-12-05 PROCEDURE — 1159F MED LIST DOCD IN RCRD: CPT

## 2023-12-05 PROCEDURE — 1126F AMNT PAIN NOTED NONE PRSNT: CPT

## 2023-12-05 RX ORDER — HYDRALAZINE HYDROCHLORIDE 50 MG/1
50 TABLET, FILM COATED ORAL 3 TIMES DAILY
Qty: 90 TABLET | Refills: 3 | Status: SHIPPED | OUTPATIENT
Start: 2023-12-05 | End: 2024-12-04

## 2023-12-05 RX ORDER — ASPIRIN 81 MG/1
81 TABLET ORAL DAILY
COMMUNITY

## 2023-12-05 NOTE — PATIENT INSTRUCTIONS
Surekha,    As requested Nutrition referral placed    Increase Hydralazine 50 mg three times daily    Check blood pressure 2 hours after taking medication    Follow up in 3 months    Yue DEWEY-CHELSIE

## 2023-12-05 NOTE — PROGRESS NOTES
"Chief Complaint:   Follow-up (Abnormal stress test)     History Of Present Illness:  Mrs. Surekha Lorenzo is a 83-year-old -American female with a past medical history of hypertension, CKD stage III, hyperlipidemia and heart failure preserved EF 65-70%, left ventricular cavity size is decreased on echocardiogram March 20, 2022. Today patient is here for HTN and Nuclear Stress test results.     Patient reports that she has been compliant with medications but cannot understand as to why her BP has been high in the mornings. She reports that BP can be as high as 170/80 . We discussed the results of the nuclear stress test. Patient denies chest pain and angina.  Pt denies shortness of breath, dyspnea on exertion, orthopnea, and paroxysmal nocturnal dyspnea.  Pt denies worsening lower extremity edema.  Pt denies palpitations or syncope.  No recent falls.  No fever or chills.  No cough.  No change in bowel or bladder habits.  No sick contacts.  No recent travel.    Last Recorded Vitals:  Vitals:    12/05/23 1111   BP: 168/72   Pulse: 54   SpO2: 94%   Weight: 60.8 kg (134 lb)   Height: 1.664 m (5' 5.5\")       Past Medical History:  She has a past medical history of Achilles tendinitis, right leg (05/30/2019), Acute upper respiratory infection, unspecified (03/06/2019), Cervicalgia (03/11/2021), Contusion of right elbow, initial encounter (10/16/2020), Contusion of right wrist, initial encounter (10/11/2020), Effusion, unspecified knee (06/25/2020), Effusion, unspecified knee (02/04/2021), Encounter for examination of eyes and vision without abnormal findings (04/02/2020), Encounter for general adult medical examination without abnormal findings (09/18/2020), Fracture of tooth (traumatic), initial encounter for closed fracture (09/09/2020), Fracture of unspecified carpal bone, right wrist, initial encounter for closed fracture (11/16/2020), History of falling, Impacted cerumen, bilateral (08/27/2020), Lumbago with " sciatica, left side (08/20/2020), Lumbago with sciatica, left side (07/08/2021), Lumbago with sciatica, unspecified side (09/13/2020), Otalgia, left ear (04/23/2020), Other abnormalities of gait and mobility (06/27/2019), Other enthesopathies, not elsewhere classified (11/25/2020), Other general symptoms and signs (05/22/2019), Other infective otitis externa, left ear (03/02/2020), Other specified cough (03/06/2019), Other specified soft tissue disorders (03/26/2018), Otitis media, unspecified, left ear (02/09/2020), Pain in left knee (11/11/2019), Pain in right ankle and joints of right foot (03/26/2018), Pain in right elbow (11/12/2020), Pain in right hip (11/21/2019), Pain in right knee (11/11/2019), Pain in right wrist (03/11/2021), Personal history of (healed) traumatic fracture (10/17/2020), Personal history of diseases of the blood and blood-forming organs and certain disorders involving the immune mechanism (05/17/2017), Personal history of other diseases of the circulatory system, Personal history of other diseases of the musculoskeletal system and connective tissue (03/11/2021), Personal history of other diseases of the musculoskeletal system and connective tissue, Personal history of other diseases of the nervous system and sense organs (08/28/2020), Personal history of other endocrine, nutritional and metabolic disease (10/16/2021), Personal history of other endocrine, nutritional and metabolic disease (10/16/2021), Personal history of other endocrine, nutritional and metabolic disease (07/08/2021), Personal history of other endocrine, nutritional and metabolic disease (07/08/2021), Personal history of other infectious and parasitic diseases (02/09/2021), Personal history of other mental and behavioral disorders (08/05/2017), Personal history of other specified conditions (04/21/2021), Personal history of other specified conditions (04/21/2021), Personal history of other specified conditions  (08/05/2017), Persons encountering health services in other specified circumstances (03/11/2021), Prediabetes (10/13/2021), Stiffness of left hand, not elsewhere classified (11/12/2020), Stiffness of right shoulder, not elsewhere classified, Type 2 diabetes mellitus without complications (CMS/[facility]) (07/23/2021), Ulnar collateral ligament sprain of right elbow, subsequent encounter (10/27/2020), Unilateral primary osteoarthritis, right knee (03/11/2021), Unspecified injury of right wrist, hand and finger(s), subsequent encounter (11/25/2020), Unspecified injury of unspecified hip, initial encounter (05/04/2015), Unspecified injury of unspecified wrist, hand and finger(s), initial encounter (10/16/2020), Unspecified injury of unspecified wrist, hand and finger(s), initial encounter (05/04/2015), Unspecified multiple injuries, initial encounter (05/17/2017), and Unspecified sprain of right wrist, initial encounter (11/16/2020).    Past Surgical History:  She has a past surgical history that includes Other surgical history (09/18/2020); CT angio head w and wo IV contrast (6/16/2023); and CT angio neck (6/16/2023).      Social History:  She reports that she has never smoked. She has never used smokeless tobacco. No history on file for alcohol use and drug use.    Family History:  Family History   Problem Relation Name Age of Onset    Hypertension Other          Allergies:  Prednisone    Outpatient Medications:  Current Outpatient Medications   Medication Instructions    hydrALAZINE (APRESOLINE) 50 mg, oral, 2 times daily    losartan (COZAAR) 100 mg, oral, Daily       Physical Exam  Constitutional:       Appearance: Normal appearance.   Neck:      Vascular: No carotid bruit.   Cardiovascular:      Rate and Rhythm: Normal rate and regular rhythm.      Heart sounds: No murmur heard.  Pulmonary:      Effort: Pulmonary effort is normal.      Breath sounds: Normal breath sounds.   Abdominal:      Palpations: Abdomen is soft.    Skin:     General: Skin is warm.   Neurological:      Mental Status: She is alert and oriented to person, place, and time.   Psychiatric:         Mood and Affect: Mood normal.     Last Labs:  CBC -  Lab Results   Component Value Date    WBC 3.4 (L) 06/16/2023    HGB 11.9 (L) 06/16/2023    HCT 35.5 (L) 06/16/2023    MCV 88 06/16/2023     06/16/2023       CMP -  Lab Results   Component Value Date    CALCIUM 9.6 06/16/2023    PHOS 3.1 04/10/2023    PROT 7.0 06/16/2023    ALBUMIN 4.2 06/16/2023    AST 15 06/16/2023    ALT 7 06/16/2023    ALKPHOS 60 06/16/2023    BILITOT 0.4 06/16/2023       LIPID PANEL -   Lab Results   Component Value Date    CHOL 194 06/16/2023    TRIG 75 06/16/2023    HDL 79.1 06/16/2023    CHHDL 2.5 06/16/2023    LDLF 100 (H) 06/16/2023    VLDL 15 06/16/2023       RENAL FUNCTION PANEL -   Lab Results   Component Value Date    GLUCOSE 86 06/16/2023     06/16/2023    K 4.1 06/16/2023     06/16/2023    CO2 28 06/16/2023    ANIONGAP 11 06/16/2023    BUN 21 06/16/2023    CREATININE 1.22 (H) 06/16/2023    CALCIUM 9.6 06/16/2023    PHOS 3.1 04/10/2023    ALBUMIN 4.2 06/16/2023        Lab Results   Component Value Date    BNP 63 06/16/2023    HGBA1C 6.3 (A) 06/16/2023       Last Cardiology Tests:  Echo: 6/17/2023  CONCLUSIONS:  1. Left ventricular systolic function is normal with a 60-65% estimated ejection fraction.  2. Spectral Doppler shows a pseudonormal pattern of left ventricular diastolic filling.  3. There is an elevated mean left atrial pressure.  4. RVSP within normal limits.  Stress Test:  Nuclear Stress Test 11/10/2023  Imaging FINDINGS:  Stress and rest images both demonstrate a normal distribution of  perfusion throughout all LV segments with no sign of ischemia. Mild  GI uptake is noted.      TID: 1.12      ECG-gated images demonstrate normal LV size and myocardial  contractility with an LV ejection fraction of 75 % (normal above 45  percent).      IMPRESSION:  1. Normal  stress myocardial perfusion imaging in response to exercise  stress.  2. Well-maintained left ventricular function.    Exercise Summary:   1. The patient exercised to stage I on a Half Parminder protocol for 5 minutes and 00 seconds, achieving 3.9 METS. Patient received a total of 35 mCi of Myoview at 9:22:11 AM. The peak heart rate achieved was 150 bpm, which was 110 % of the age predicted target heart rate.   2. Abnormal Stress Test. Stress ECG showed sinus tachycardia, with 1-2 mm inferolateral ST depression resolving around 1 minute into recovery concerning for ischemia.   3. Of note, the patient wsa hypertensive throughout stress testing with peak /78 mmHg.   4. Abnormal Stress Test.   5. Adequate level of stress achieved.   6. Nuclear image results are reported separately.    Assessment/Plan    Mrs. Surekha Lorenzo is a 83-year-old -American female with a past medical history of hypertension, CKD stage III, hyperlipidemia and heart failure preserved EF 65-70%, left ventricular cavity size is decreased on echocardiogram March 20, 2022. Today patient is here for HTN and Nuclear Stress test results.    This patient was seen in collaboration with Dr. Herrera regarding stress test.   Patient reports that she has been compliant with medications but cannot understand as to why her BP has been high in the mornings. She reports that BP can be as high as 170/80 . We discussed the results of the nuclear stress test which was partly abnormal. Patient had a good chronotropic response to exercise but had 1-2 mm ST depression which was concerning for ischemia but had normal myocardial perfusion . She denies chest pain or increased SOB. She is exercising with water aerobics 3-4 days per week.     Given that the patient does not have cardiac symptoms no further cardiac testing is needed at this time. BP is not at goal of less than 130/80. Patient refused to start Amlodipine because she enjoys grapefruit and does not want  to give it up. She also did not feel that it helped her when she was on it in the past.  She agreed to increase Hydralazine to 50 mg three times per day. She will continue taking Amlodipine 10 mg daily.     She requested a Nutrition consult for Diet management with HTN    Patient to follow up in 3 months       ZULEIMA Cabrales-CNP

## 2024-02-05 ENCOUNTER — TELEPHONE (OUTPATIENT)
Dept: NEUROLOGY | Facility: HOSPITAL | Age: 84
End: 2024-02-05

## 2024-02-06 ENCOUNTER — APPOINTMENT (OUTPATIENT)
Dept: CARDIOLOGY | Facility: CLINIC | Age: 84
End: 2024-02-06
Payer: MEDICARE

## 2024-02-27 ENCOUNTER — LAB (OUTPATIENT)
Dept: LAB | Facility: LAB | Age: 84
End: 2024-02-27
Payer: MEDICARE

## 2024-02-27 DIAGNOSIS — E11.9 TYPE 2 DIABETES MELLITUS WITHOUT COMPLICATIONS (MULTI): Primary | ICD-10-CM

## 2024-02-27 LAB
ALBUMIN SERPL BCP-MCNC: 4 G/DL (ref 3.4–5)
ALP SERPL-CCNC: 58 U/L (ref 33–136)
ALT SERPL W P-5'-P-CCNC: 10 U/L (ref 7–45)
ANION GAP SERPL CALC-SCNC: 12 MMOL/L (ref 10–20)
AST SERPL W P-5'-P-CCNC: 15 U/L (ref 9–39)
BILIRUB SERPL-MCNC: 0.6 MG/DL (ref 0–1.2)
BUN SERPL-MCNC: 20 MG/DL (ref 6–23)
CALCIUM SERPL-MCNC: 9.4 MG/DL (ref 8.6–10.3)
CHLORIDE SERPL-SCNC: 102 MMOL/L (ref 98–107)
CHOLEST SERPL-MCNC: 226 MG/DL (ref 0–199)
CHOLESTEROL/HDL RATIO: 2.6
CO2 SERPL-SCNC: 30 MMOL/L (ref 21–32)
CREAT SERPL-MCNC: 1.31 MG/DL (ref 0.5–1.05)
EGFRCR SERPLBLD CKD-EPI 2021: 41 ML/MIN/1.73M*2
EST. AVERAGE GLUCOSE BLD GHB EST-MCNC: 126 MG/DL
GLUCOSE SERPL-MCNC: 88 MG/DL (ref 74–99)
HBA1C MFR BLD: 6 %
HDLC SERPL-MCNC: 86 MG/DL
LDLC SERPL CALC-MCNC: 130 MG/DL
NON HDL CHOLESTEROL: 140 MG/DL (ref 0–149)
POTASSIUM SERPL-SCNC: 4.2 MMOL/L (ref 3.5–5.3)
PROT SERPL-MCNC: 6.8 G/DL (ref 6.4–8.2)
SODIUM SERPL-SCNC: 140 MMOL/L (ref 136–145)
TRIGL SERPL-MCNC: 51 MG/DL (ref 0–149)
TSH SERPL-ACNC: 0.48 MIU/L (ref 0.44–3.98)
VLDL: 10 MG/DL (ref 0–40)

## 2024-02-27 PROCEDURE — 36415 COLL VENOUS BLD VENIPUNCTURE: CPT

## 2024-02-27 PROCEDURE — 80053 COMPREHEN METABOLIC PANEL: CPT

## 2024-02-27 PROCEDURE — 83036 HEMOGLOBIN GLYCOSYLATED A1C: CPT

## 2024-02-27 PROCEDURE — 84443 ASSAY THYROID STIM HORMONE: CPT

## 2024-02-27 PROCEDURE — 80061 LIPID PANEL: CPT

## 2024-03-07 ENCOUNTER — OFFICE VISIT (OUTPATIENT)
Dept: NEPHROLOGY | Facility: CLINIC | Age: 84
End: 2024-03-07
Payer: MEDICARE

## 2024-03-07 VITALS
HEIGHT: 66 IN | WEIGHT: 131 LBS | DIASTOLIC BLOOD PRESSURE: 51 MMHG | BODY MASS INDEX: 21.05 KG/M2 | HEART RATE: 54 BPM | SYSTOLIC BLOOD PRESSURE: 150 MMHG

## 2024-03-07 DIAGNOSIS — I10 PRIMARY HYPERTENSION: ICD-10-CM

## 2024-03-07 PROCEDURE — 1126F AMNT PAIN NOTED NONE PRSNT: CPT | Performed by: INTERNAL MEDICINE

## 2024-03-07 PROCEDURE — 99214 OFFICE O/P EST MOD 30 MIN: CPT | Performed by: INTERNAL MEDICINE

## 2024-03-07 PROCEDURE — 1036F TOBACCO NON-USER: CPT | Performed by: INTERNAL MEDICINE

## 2024-03-07 PROCEDURE — 3078F DIAST BP <80 MM HG: CPT | Performed by: INTERNAL MEDICINE

## 2024-03-07 PROCEDURE — 3077F SYST BP >= 140 MM HG: CPT | Performed by: INTERNAL MEDICINE

## 2024-03-07 RX ORDER — AMLODIPINE BESYLATE 10 MG/1
TABLET ORAL DAILY
COMMUNITY

## 2024-03-07 NOTE — PATIENT INSTRUCTIONS
Blood work looks good kidney wise  I recommend either increasing the hydralazine to 75 mg twice daily (from the current 50 mg twice a day) or startig low dose water pill , like indapamide 1.25 mg daily or chlorthalidone 12.5 mg daily. Please discuss this with your primary care doctor.  Also talk to your doctor about low dose medication for your cholesterol.  See you back in the office in 6 months.

## 2024-03-07 NOTE — PROGRESS NOTES
"Surekha Lorenzo is a 83 y.o. female here in follow up for CKD    Subjective   Here in follow up for CKD. Has new PCP. Being evaluated for gross hematuria at [facility]. Brings home BP for review. BP in general in the 140s; only 2 days with readings in the 110s. Meds were reviewed and are as written, except for hydralazine which she only takes twice a day. Did not have renal arterial dopplers performed in the interim. Will reconsider once hematuria eval completed. Exercises regularly. Denies chest pain, dyspnea, leg swelling, fevers or chills.       ROS  All the rest reviewed and negative    Objective     Vital signs    /51   Pulse 54   Ht 1.664 m (5' 5.5\")   Wt 59.4 kg (131 lb)   BMI 21.47 kg/m²     Physical Exam  Constitutional:  well appearing, in NAD  Psychiatric:  appropriate mood and behavior    HEENT: NC/AT, clear sclerae, moist mucous membranes  Cardiovascular: normal S1, S2, RRR,  no murmurs, gallop or rubs  Pulmonary:  Normal breath sounds  Extremities: no edema  Skin:  warm and dry    Meds    Current Outpatient Medications:     amLODIPine (Norvasc) 10 mg tablet, Take by mouth once daily., Disp: , Rfl:     aspirin 81 mg EC tablet, Take 1 tablet (81 mg) by mouth once daily., Disp: , Rfl:     hydrALAZINE (Apresoline) 50 mg tablet, Take 1 tablet (50 mg) by mouth 3 times a day., Disp: 90 tablet, Rfl: 3    losartan (Cozaar) 100 mg tablet, Take 1 tablet (100 mg) by mouth once daily., Disp: 30 tablet, Rfl: 11     Allergies  Allergies   Allergen Reactions    Prednisone Swelling and Unknown        Results  Recent Results (from the past 672 hour(s))   Comprehensive Metabolic Panel    Collection Time: 02/27/24 11:24 AM   Result Value Ref Range    Glucose 88 74 - 99 mg/dL    Sodium 140 136 - 145 mmol/L    Potassium 4.2 3.5 - 5.3 mmol/L    Chloride 102 98 - 107 mmol/L    Bicarbonate 30 21 - 32 mmol/L    Anion Gap 12 10 - 20 mmol/L    Urea Nitrogen 20 6 - 23 mg/dL    Creatinine 1.31 (H) 0.50 - 1.05 mg/dL    eGFR 41 (L) " >60 mL/min/1.73m*2    Calcium 9.4 8.6 - 10.3 mg/dL    Albumin 4.0 3.4 - 5.0 g/dL    Alkaline Phosphatase 58 33 - 136 U/L    Total Protein 6.8 6.4 - 8.2 g/dL    AST 15 9 - 39 U/L    Bilirubin, Total 0.6 0.0 - 1.2 mg/dL    ALT 10 7 - 45 U/L   Hemoglobin A1C    Collection Time: 02/27/24 11:24 AM   Result Value Ref Range    Hemoglobin A1C 6.0 (H) see below %    Estimated Average Glucose 126 Not Established mg/dL   Lipid Panel    Collection Time: 02/27/24 11:24 AM   Result Value Ref Range    Cholesterol 226 (H) 0 - 199 mg/dL    HDL-Cholesterol 86.0 mg/dL    Cholesterol/HDL Ratio 2.6     LDL Calculated 130 (H) <=99 mg/dL    VLDL 10 0 - 40 mg/dL    Triglycerides 51 0 - 149 mg/dL    Non HDL Cholesterol 140 0 - 149 mg/dL   TSH with reflex to Free T4 if abnormal    Collection Time: 02/27/24 11:24 AM   Result Value Ref Range    Thyroid Stimulating Hormone 0.48 0.44 - 3.98 mIU/L     Component  Ref Range & Units 3 mo ago   Creatinine, Ur Random (UCRR)  20.0 - 300.0 mg/dL 76.1   Albumin, Urine Random  mg/L <12.0   Albumin/Creat Ratio  <30 mg/g <16     Imaging results  === 10/13/22 ===  US RENAL COMPLETE    - Impression -  Slightly increased size of septated cyst of the right kidney upper  pole, now measuring up to 1.5 cm, previously measuring 1.3 cm.    Slight interval increased size of left renal midpole cyst now  measuring up to 5.6 cm, previously measuring 5.2 cm.    Assessment and Plan  Impression:  1.CKD stage 3b/A1, likely hypertensive nephrosclerosis or remote episode of BRIA. Serum creatinine last month stable at 1.31 rendering an eGFR of 41 ml/min/1.73m2.  2. Septated R renal cyst (1.5 cm). Undergoing evaluation for gross hematuria at [facility].  3. HTN - BP above goal here today. Wants PCP to adjust meds. Will provide recommendations - below.    Plan/recommendations:   - increase Hydralazine to 75 mg po bid or start indapamide 1.25 mg daily  - low salt diet, exercise as able  - consider statin  - continue home BP check am and  pm once a week , am and pm  - consider renal arteries vascular dopplers  - avoid NSAID          RTC in 6 months         Eileen Russell MD

## 2024-03-21 ENCOUNTER — APPOINTMENT (OUTPATIENT)
Dept: NEUROLOGY | Facility: HOSPITAL | Age: 84
End: 2024-03-21
Payer: MEDICARE

## 2024-03-27 ENCOUNTER — APPOINTMENT (OUTPATIENT)
Dept: CARDIOLOGY | Facility: CLINIC | Age: 84
End: 2024-03-27
Payer: MEDICARE

## 2024-04-22 ENCOUNTER — NUTRITION (OUTPATIENT)
Dept: NUTRITION | Facility: CLINIC | Age: 84
End: 2024-04-22
Payer: MEDICARE

## 2024-04-22 VITALS — WEIGHT: 129 LBS | HEIGHT: 66 IN | BODY MASS INDEX: 20.73 KG/M2

## 2024-04-22 DIAGNOSIS — I10 PRIMARY HYPERTENSION: ICD-10-CM

## 2024-04-22 PROCEDURE — 97802 MEDICAL NUTRITION INDIV IN: CPT | Performed by: DIETITIAN, REGISTERED

## 2024-04-22 NOTE — PATIENT INSTRUCTIONS
1) Eating fish and seafood high in omega three fatty acids can lower risk of heart attacks and stroke. Women need 1.1 grams of omega three fatty acids per day. To meet this recommendation, consider two 4-ounce servings of oily fish per week are recommended as a way to consume a daily dose of EPA and DHA. Fatty fish include Mussels, Oysters, Wild salmon (sockeye, coho, chum and pink), Sardines,  Swordfish, Trout, Albacore tuna, [place], Wild Mackerel, [place] Trout, [place], Canned salmon, and Wild (thomas) and farmed blue fin tuna. Consider a supplement that contains the recommended amount of omega three fatty acid (total EPA/DHA) within 2 supplements.   2) It is recommended to limit sodium to less than 1,500 mg of sodium per day. In order to meet this goal, aim to examine labels and select only low sodium foods. Low sodium foods are foods with less than 140 mg of sodium per serving or less than 5% Daily Value.     Educational Handouts: Hypertension Nutrition Therapy (2018) and Heart-Healthy Fats: Omega-3 Fatty Acids (2017)    Lindsey Gaines, MS, RDN, LD, CAM   Advanced Practice Clinical Dietitian  [email or web address]  Schedule line [phone]  Direct line [phone]

## 2024-04-22 NOTE — PROGRESS NOTES
Reason for Nutrition Visit:  Pt is a 84 y.o. female being seen at [facility]. Pt was referred by HAYDEN Cabrales with effective referral date of April 22, 2024.          1. Primary hypertension  Referral to Nutrition Services           Past Medical Hx:  Patient Active Problem List   Diagnosis    After cataract of both eyes not obscuring vision    Anemia    Bilateral leg and foot pain    Bilateral myopia    Bilateral sensorineural hearing loss    Bradycardia    Chest pain    Chronic diastolic heart failure (Multi)    Chronic left shoulder pain    CKD (chronic kidney disease) stage 3, GFR 30-59 ml/min (Multi)    Diabetes mellitus type 2 without retinopathy (Multi)    HTN (hypertension)    Hyperlipidemia    Left knee pain    Low back pain    Multinodular goiter    Myalgia, other site    Nonscarring hair loss, unspecified    Osteoarthritis of right knee    Pain of right hand    Palpitations    Plantar fasciitis, bilateral    Pseudophakia of both eyes    Scapular dysfunction    Segmental and somatic dysfunction    Sinus node dysfunction (Multi)    Skin changes due to chronic exposure to nonionizing radiation, unspecified    Systolic murmur    Telogen effluvium    Trochanteric bursitis of left hip    Xerosis cutis    TIA (transient ischemic attack)    PVD (posterior vitreous detachment), right eye    Menopause    Lateral pain of left hip    Lateral pain of hip    ITB syndrome    H/O calcium pyrophosphate deposition disease (CPPD)    Expressive aphasia    Amaurosis fugax of left eye    Amaurosis fugax of right eye    Aphasia due to recent stroke    Abnormal nuclear stress test        Current Outpatient Medications:     amLODIPine (Norvasc) 10 mg tablet, Take by mouth once daily., Disp: , Rfl:     aspirin 81 mg EC tablet, Take 1 tablet (81 mg) by mouth once daily., Disp: , Rfl:     hydrALAZINE (Apresoline) 50 mg tablet, Take 1 tablet (50 mg) by mouth 3 times a day., Disp: 90 tablet, Rfl: 3    losartan (Cozaar) 100  "mg tablet, Take 1 tablet (100 mg) by mouth once daily., Disp: 30 tablet, Rfl: 11     Anthropometrics:  Anthropometrics  Height: 166.4 cm (5' 5.51\")  Weight: 58.5 kg (129 lb)  BMI (Calculated): 21.13   Weight change:    Significant Weight Change: No  2022 Weight: 142 lbs     Lab Results   Component Value Date    HGBA1C 6.0 (H) 02/27/2024    CHOL 226 (H) 02/27/2024    LDLF 100 (H) 06/16/2023    TRIG 51 02/27/2024    HDL 86.0 02/27/2024      GFR Female   Date Value Ref Range Status   06/16/2023 44 (A) >90 mL/min/1.73m2 Final     Comment:      CALCULATIONS OF ESTIMATED GFR ARE PERFORMED   USING THE 2021 CKD-EPI STUDY REFIT EQUATION   WITHOUT THE RACE VARIABLE FOR THE IDMS-TRACEABLE   CREATININE METHODS.    [email or web address]://jasn.asnjournals.org/content/early/[email or web address]/[email or web address]         Lab Results   Component Value Date    WBC 3.4 (L) 06/16/2023    HGB 11.9 (L) 06/16/2023    HCT 35.5 (L) 06/16/2023    MCV 88 06/16/2023     06/16/2023        Chemistry    Lab Results   Component Value Date/Time     02/27/2024 1124    K 4.2 02/27/2024 1124     02/27/2024 1124    CO2 30 02/27/2024 1124    BUN 20 02/27/2024 1124    CREATININE 1.31 (H) 02/27/2024 1124    Lab Results   Component Value Date/Time    CALCIUM 9.4 02/27/2024 1124    ALKPHOS 58 02/27/2024 1124    AST 15 02/27/2024 1124    ALT 10 02/27/2024 1124    BILITOT 0.6 02/27/2024 1124         Food and Nutrition Hx:  Pt was last seen by dietitian in 2022; in 2022 labs improved as follows. A1c from 6.7% to 6.2%; LDL from 128- 108 mg/dl. Pt was testing blood glucose.   Pt stated she had a mini stroke on her left side last summer. She reports the stroke from one of her HTN meds. She denies swallowing issues or any physical impairment.   She would like to consume a heart healthy, kidney and for pre-diabetes. Her focus is on heart esther. She also notes being hospitalized last fall for elevated BP as a result of eating very high Na foods.   She does use sea salt at " "times. She loves salt.   Pt has a goiter. She states she has always had it even when using Iodine salt.   Note: pt is hard of hearing so minimal questions was given.     24 Diet Recall:  Meal 1: oatmeal with a piece of toast with berries   Meal 2: sardines with guan and mustard   Snack may be [place] (Na 135 mg)   Meal 3: cod, collards greens, and macaroni and cheese  Snacks:  Beverages: water     Allergies: None  Intolerance: Lactose  Appetite: Good  Intake: >75%  GI Symptoms : constipation Frequency: occasional  Swallowing Difficulty: No problems with swallowing  Dentition : own    Types of Activities: House/Yard Work and Walking  Pt tries to exercise 150 minutes of exercise of per week to include cardio and resistance.       Supplements: Calcium and Fish Oil daily. She also takes a MVI.     Cravings: Sweet. Clear pop may be consumed a few times. She states this is sugar free.       Food Preparation: Patient  Cooking Skills/Barriers: None reported  Grocery Shopping: Patient    Eating Out Type: Dinner  2 times per month    Nutrition Focused Physical Exam:    Performed/Deferred: Performed    Muscle Wasting:  Temporal: None  Shoulder: Mild  Mild  Interosseous Muscle: None  Quadriceps: None    Loss of Subcutaneous Fat:  Eyes: None  Perioral: None  Triceps: None  Chest: None    Other Physical Findings:  Hair: None  None  Mouth: None  Skin: None  Nails: None  none    Malnutrition Present: No    Estimated Energy Needs:  Energy Needs  Calculated Energy Needs Using Equations  Height: 166.4 cm (5' 5.51\")  Weight Used for Equation Calculations: 58.5 kg (129 lb)  [place] Equation (Overweight or Obese Patients): 1044  Activity Factor: 1.4  Total Energy Needs: 1461  Estimated Protein Needs  Total Protein Estimated Needs (g): 58 g  Total Protein Estimated Needs (g/kg): 1 g/kg  Method for Estimating Needs: more protein was given due to her age.     Nutrition Diagnosis:  Malnutrition Diagnosis    Patient has " Malnutrition DiagnosisNo  Nutrition Diagnosis    Patient has Nutrition DiagnosisYes  Diagnosis Status (1) New  Nutrition Diagnosis 1Food and nutrition related knowledge deficitRelated to (1)how to eat to keep BP lowerAs Evidenced by (1)reprots by pt of the need to learn.    Diagnosis Status (2)New  Nutrition Diagnosis 2Predicted excessive nutrient intakeRelated to (2)foods higher in sodium that could exaccerabate BPAs Evidenced by (2)some foods consumed are higher then what is recommended on a low sodium diet.    Diagnosis Status (3)New  Nutrition Diagnosis 3Inadequate fat intakeRelated to (3)omega three fatty acids in the presence of recent hx of a strokeAs Evidenced by (3)omega three fatty acids does not meet daily needs    Nutrition Interventions:  Medical nutrition therapy was given for HTN.   Nutrition Counseling: Motivational Interviewing and CBT  Coordination of Care: None    Nutrition Recommendations:   1,461 calories per day to maintain wt. Heart healthy meal plan with a low saturated fat intake to <5 -6 % of energy (less than 10 grams of saturated fat per day), reduced intake of added sugars (<10% of total energy), 25 grams of fiber with intake of viscous fiber to 5 g/day to 10 g/day, plant sterols/stanols to 2 g/day, 1.1 gram of omega three fatty acids, and a 1,500 mg sodium restriction. CHO  consistent meal plan with aim for 45 grams of CHO at meals and 15 grams at snacks. Monitor kidney function.     Nutrition Goals:  Via teach back method patient verbalized understanding of the following topics:  1) Eating fish and seafood high in omega three fatty acids can lower risk of heart attacks and stroke. Women need 1.1 grams of omega three fatty acids per day. To meet this recommendation, consider two 4-ounce servings of oily fish per week are recommended as a way to consume a daily dose of EPA and DHA. Fatty fish include Mussels, Oysters, Wild salmon (sockeye, coho, chum and pink), Sardines,  Swordfish,  Trout, Albacore tuna, [place], Wild Mackerel, [place] Trout, [place], Canned salmon, and Wild (thomas) and farmed blue fin tuna. Consider a supplement that contains the recommended amount of omega three fatty acid (total EPA/DHA) within 2 supplements.   2) It is recommended to limit sodium to less than 1,500 mg of sodium per day. In order to meet this goal, aim to examine labels and select only low sodium foods. Low sodium foods are foods with less than 140 mg of sodium per serving or less than 5% Daily Value.     Educational Handouts: Hypertension Nutrition Therapy (2018) and Heart-Healthy Fats: Omega-3 Fatty Acids (2017)    Lindsey Gaines MS, RDN, LD, CAM   Advanced Practice Clinical Dietitian  [email or web address]  Schedule line [phone]  Direct line [phone]     Readiness to Change : Good  Level of Understanding : Good  Anticipated Compliant : Good

## 2024-08-26 ENCOUNTER — OFFICE VISIT (OUTPATIENT)
Dept: CARDIOLOGY | Facility: CLINIC | Age: 84
End: 2024-08-26
Payer: MEDICARE

## 2024-08-26 VITALS
DIASTOLIC BLOOD PRESSURE: 68 MMHG | HEART RATE: 54 BPM | HEIGHT: 65 IN | BODY MASS INDEX: 21.49 KG/M2 | WEIGHT: 129 LBS | SYSTOLIC BLOOD PRESSURE: 142 MMHG | OXYGEN SATURATION: 99 %

## 2024-08-26 DIAGNOSIS — I10 PRIMARY HYPERTENSION: Primary | ICD-10-CM

## 2024-08-26 DIAGNOSIS — E78.5 HYPERLIPIDEMIA, UNSPECIFIED HYPERLIPIDEMIA TYPE: ICD-10-CM

## 2024-08-26 PROCEDURE — 3078F DIAST BP <80 MM HG: CPT

## 2024-08-26 PROCEDURE — 99214 OFFICE O/P EST MOD 30 MIN: CPT

## 2024-08-26 PROCEDURE — 1159F MED LIST DOCD IN RCRD: CPT

## 2024-08-26 PROCEDURE — 1036F TOBACCO NON-USER: CPT

## 2024-08-26 PROCEDURE — 3077F SYST BP >= 140 MM HG: CPT

## 2024-08-26 RX ORDER — LOSARTAN POTASSIUM 50 MG/1
50 TABLET ORAL 2 TIMES DAILY
Qty: 180 TABLET | Refills: 3 | Status: SHIPPED | OUTPATIENT
Start: 2024-08-26 | End: 2025-08-26

## 2024-08-26 ASSESSMENT — ENCOUNTER SYMPTOMS: OCCASIONAL FEELINGS OF UNSTEADINESS: 0

## 2024-08-26 NOTE — PROGRESS NOTES
Chief Complaint:   FUV  History Of Present Illness:     Mrs. Surekha Lorenzo is a 83-year-old -American female with a past medical history of hypertension, CKD stage III, hyperlipidemia and heart failure preserved EF 65-70%, left ventricular cavity size is decreased on echocardiogram March 20, 2022. Today patient is here for HTN and Nuclear Stress test results.     She reports to doing well. She continues to water aerobics 2-3 days and has been walking 30-45 minutes a couple times per week.  Patient denies chest pain and angina.  Pt denies shortness of breath, dyspnea on exertion, orthopnea, and paroxysmal nocturnal dyspnea.  Pt denies worsening lower extremity edema.  Pt denies palpitations or syncope.  No recent falls.  No fever or chills.  No cough.  No change in bowel or bladder habits.  No sick contacts.  No recent travel.    Review of Systems   All other systems reviewed and are negative.    Last Recorded Vitals:  Vitals:    08/26/24 1435   BP: 142/68   Pulse: 54   SpO2: 99%     Past Medical History:  She has a past medical history of Achilles tendinitis, right leg (05/30/2019), Acute upper respiratory infection, unspecified (03/06/2019), Cervicalgia (03/11/2021), Contusion of right elbow, initial encounter (10/16/2020), Contusion of right wrist, initial encounter (10/11/2020), Effusion, unspecified knee (06/25/2020), Effusion, unspecified knee (02/04/2021), Encounter for examination of eyes and vision without abnormal findings (04/02/2020), Encounter for general adult medical examination without abnormal findings (09/18/2020), Fracture of tooth (traumatic), initial encounter for closed fracture (09/09/2020), Fracture of unspecified carpal bone, right wrist, initial encounter for closed fracture (11/16/2020), History of falling, Impacted cerumen, bilateral (08/27/2020), Lumbago with sciatica, left side (08/20/2020), Lumbago with sciatica, left side (07/08/2021), Lumbago with sciatica, unspecified side  (09/13/2020), Otalgia, left ear (04/23/2020), Other abnormalities of gait and mobility (06/27/2019), Other enthesopathies, not elsewhere classified (11/25/2020), Other general symptoms and signs (05/22/2019), Other infective otitis externa, left ear (03/02/2020), Other specified cough (03/06/2019), Other specified soft tissue disorders (03/26/2018), Otitis media, unspecified, left ear (02/09/2020), Pain in left knee (11/11/2019), Pain in right ankle and joints of right foot (03/26/2018), Pain in right elbow (11/12/2020), Pain in right hip (11/21/2019), Pain in right knee (11/11/2019), Pain in right wrist (03/11/2021), Personal history of (healed) traumatic fracture (10/17/2020), Personal history of diseases of the blood and blood-forming organs and certain disorders involving the immune mechanism (05/17/2017), Personal history of other diseases of the circulatory system, Personal history of other diseases of the musculoskeletal system and connective tissue (03/11/2021), Personal history of other diseases of the musculoskeletal system and connective tissue, Personal history of other diseases of the nervous system and sense organs (08/28/2020), Personal history of other endocrine, nutritional and metabolic disease (10/16/2021), Personal history of other endocrine, nutritional and metabolic disease (10/16/2021), Personal history of other endocrine, nutritional and metabolic disease (07/08/2021), Personal history of other endocrine, nutritional and metabolic disease (07/08/2021), Personal history of other infectious and parasitic diseases (02/09/2021), Personal history of other mental and behavioral disorders (08/05/2017), Personal history of other specified conditions (04/21/2021), Personal history of other specified conditions (04/21/2021), Personal history of other specified conditions (08/05/2017), Persons encountering health services in other specified circumstances (03/11/2021), Prediabetes (10/13/2021), Stiffness  of left hand, not elsewhere classified (11/12/2020), Stiffness of right shoulder, not elsewhere classified, Type 2 diabetes mellitus without complications (Multi) (07/23/2021), Ulnar collateral ligament sprain of right elbow, subsequent encounter (10/27/2020), Unilateral primary osteoarthritis, right knee (03/11/2021), Unspecified injury of right wrist, hand and finger(s), subsequent encounter (11/25/2020), Unspecified injury of unspecified hip, initial encounter (05/04/2015), Unspecified injury of unspecified wrist, hand and finger(s), initial encounter (10/16/2020), Unspecified injury of unspecified wrist, hand and finger(s), initial encounter (05/04/2015), Unspecified multiple injuries, initial encounter (05/17/2017), and Unspecified sprain of right wrist, initial encounter (11/16/2020).    Past Surgical History:  She has a past surgical history that includes Other surgical history (09/18/2020); CT angio head w and wo IV contrast (6/16/2023); and CT angio neck (6/16/2023).      Social History:  She reports that she has never smoked. She has never used smokeless tobacco. No history on file for alcohol use and drug use.    Family History:  Family History   Problem Relation Name Age of Onset    Hypertension Other          Allergies:  Prednisone    Outpatient Medications:  Current Outpatient Medications   Medication Instructions    amLODIPine (Norvasc) 10 mg tablet oral, Daily    aspirin 81 mg, oral, Daily    hydrALAZINE (APRESOLINE) 50 mg, oral, 3 times daily    losartan (COZAAR) 100 mg, oral, Daily       Physical Exam  Constitutional:       Appearance: Normal appearance.   Neck:      Vascular: No carotid bruit.   Cardiovascular:      Rate and Rhythm: Normal rate and regular rhythm.      Heart sounds: No murmur heard.  Pulmonary:      Effort: Pulmonary effort is normal.      Breath sounds: Normal breath sounds.   Abdominal:      Palpations: Abdomen is soft.   Skin:     General: Skin is warm.   Neurological:       Mental Status: She is alert and oriented to person, place, and time.   Psychiatric:         Mood and Affect: Mood normal.       Last Labs:  CBC -  Lab Results   Component Value Date    WBC 3.4 (L) 06/16/2023    HGB 11.9 (L) 06/16/2023    HCT 35.5 (L) 06/16/2023    MCV 88 06/16/2023     06/16/2023       CMP -  Lab Results   Component Value Date    CALCIUM 9.4 02/27/2024    PHOS 3.1 04/10/2023    PROT 6.8 02/27/2024    ALBUMIN 4.0 02/27/2024    AST 15 02/27/2024    ALT 10 02/27/2024    ALKPHOS 58 02/27/2024    BILITOT 0.6 02/27/2024       LIPID PANEL -   Lab Results   Component Value Date    CHOL 226 (H) 02/27/2024    TRIG 51 02/27/2024    HDL 86.0 02/27/2024    CHHDL 2.6 02/27/2024    LDLF 100 (H) 06/16/2023    VLDL 10 02/27/2024    NHDL 140 02/27/2024       RENAL FUNCTION PANEL -   Lab Results   Component Value Date    GLUCOSE 88 02/27/2024     02/27/2024    K 4.2 02/27/2024     02/27/2024    CO2 30 02/27/2024    ANIONGAP 12 02/27/2024    BUN 20 02/27/2024    CREATININE 1.31 (H) 02/27/2024    CALCIUM 9.4 02/27/2024    PHOS 3.1 04/10/2023    ALBUMIN 4.0 02/27/2024        Lab Results   Component Value Date    BNP 63 06/16/2023    HGBA1C 6.0 (H) 02/27/2024     Last Cardiology Tests:  Echo: 6/17/2023  CONCLUSIONS:  1. Left ventricular systolic function is normal with a 60-65% estimated ejection fraction.  2. Spectral Doppler shows a pseudonormal pattern of left ventricular diastolic filling.  3. There is an elevated mean left atrial pressure.  4. RVSP within normal limits.  Stress Test:  Nuclear Stress Test 11/10/2023  Imaging FINDINGS:  Stress and rest images both demonstrate a normal distribution of  perfusion throughout all LV segments with no sign of ischemia. Mild  GI uptake is noted.      TID: 1.12      ECG-gated images demonstrate normal LV size and myocardial  contractility with an LV ejection fraction of 75 % (normal above 45  percent).      IMPRESSION:  1. Normal stress myocardial perfusion  imaging in response to exercise  stress.  2. Well-maintained left ventricular function.     Exercise Summary:   1. The patient exercised to stage I on a Half Parminder protocol for 5 minutes and 00 seconds, achieving 3.9 METS. Patient received a total of 35 mCi of Myoview at 9:22:11 AM. The peak heart rate achieved was 150 bpm, which was 110 % of the age predicted target heart rate.   2. Abnormal Stress Test. Stress ECG showed sinus tachycardia, with 1-2 mm inferolateral ST depression resolving around 1 minute into recovery concerning for ischemia.   3. Of note, the patient was hypertensive throughout stress testing with peak /78 mmHg.   4. Abnormal Stress Test.   5. Adequate level of stress achieved.   6. Nuclear image results are reported separately.         Assessment/Plan    Mrs. Surekha Lorenzo is a 83-year-old -American female with a past medical history of hypertension, CKD stage III, hyperlipidemia and heart failure preserved EF 65-70%, left ventricular cavity size is decreased on echocardiogram March 20, 2022. Today patient is here for HTN and Nuclear Stress test results.     She reports to doing well. She continues to exercise by doing water aerobics 2-3 days and walking 30-45 minutes a couple times per week. Today she denies cardiac complaints. She request to have her losartan spaced out 50 mg twice per day which is acceptable. She appears euvolemic and denies cardiac symptoms.  We discussed LDL which is not at goal. She refuses statin at this time. She would like to attempt diet modification.     Fasting Lipid Panel in 3 months    Follow up in 6 months or sooner if you have cardiac symptoms       Yue Batista, APRN-CNP

## 2024-09-24 ENCOUNTER — LAB (OUTPATIENT)
Dept: LAB | Facility: LAB | Age: 84
End: 2024-09-24
Payer: MEDICARE

## 2024-09-24 DIAGNOSIS — E78.5 HYPERLIPIDEMIA, UNSPECIFIED HYPERLIPIDEMIA TYPE: ICD-10-CM

## 2024-09-24 DIAGNOSIS — N18.30 STAGE 3 CHRONIC KIDNEY DISEASE, UNSPECIFIED WHETHER STAGE 3A OR 3B CKD (MULTI): ICD-10-CM

## 2024-09-24 LAB
CHOLEST SERPL-MCNC: 177 MG/DL (ref 0–199)
CHOLESTEROL/HDL RATIO: 2.3
HDLC SERPL-MCNC: 76.5 MG/DL
LDLC SERPL CALC-MCNC: 88 MG/DL
NON HDL CHOLESTEROL: 101 MG/DL (ref 0–149)
TRIGL SERPL-MCNC: 62 MG/DL (ref 0–149)
VLDL: 12 MG/DL (ref 0–40)

## 2024-09-24 PROCEDURE — 36415 COLL VENOUS BLD VENIPUNCTURE: CPT

## 2024-09-24 PROCEDURE — 80061 LIPID PANEL: CPT

## 2024-09-24 PROCEDURE — 80069 RENAL FUNCTION PANEL: CPT

## 2024-09-26 ENCOUNTER — LAB (OUTPATIENT)
Dept: LAB | Facility: LAB | Age: 84
End: 2024-09-26
Payer: MEDICARE

## 2024-09-26 ENCOUNTER — APPOINTMENT (OUTPATIENT)
Dept: NEPHROLOGY | Facility: CLINIC | Age: 84
End: 2024-09-26
Payer: MEDICARE

## 2024-09-26 VITALS
SYSTOLIC BLOOD PRESSURE: 150 MMHG | WEIGHT: 128 LBS | DIASTOLIC BLOOD PRESSURE: 64 MMHG | BODY MASS INDEX: 21.3 KG/M2 | HEART RATE: 45 BPM

## 2024-09-26 DIAGNOSIS — N18.30 STAGE 3 CHRONIC KIDNEY DISEASE, UNSPECIFIED WHETHER STAGE 3A OR 3B CKD (MULTI): Primary | ICD-10-CM

## 2024-09-26 DIAGNOSIS — N18.30 STAGE 3 CHRONIC KIDNEY DISEASE, UNSPECIFIED WHETHER STAGE 3A OR 3B CKD (MULTI): ICD-10-CM

## 2024-09-26 LAB
ALBUMIN SERPL BCP-MCNC: 3.9 G/DL (ref 3.4–5)
ANION GAP SERPL CALC-SCNC: 12 MMOL/L (ref 10–20)
APPEARANCE UR: ABNORMAL
BILIRUB UR STRIP.AUTO-MCNC: NEGATIVE MG/DL
BUN SERPL-MCNC: 14 MG/DL (ref 6–23)
CALCIUM SERPL-MCNC: 8.8 MG/DL (ref 8.6–10.3)
CHLORIDE SERPL-SCNC: 102 MMOL/L (ref 98–107)
CO2 SERPL-SCNC: 30 MMOL/L (ref 21–32)
COLOR UR: YELLOW
CREAT SERPL-MCNC: 1.18 MG/DL (ref 0.5–1.05)
CREAT UR-MCNC: 177.6 MG/DL (ref 20–320)
EGFRCR SERPLBLD CKD-EPI 2021: 46 ML/MIN/1.73M*2
GLUCOSE SERPL-MCNC: 82 MG/DL (ref 74–99)
GLUCOSE UR STRIP.AUTO-MCNC: NORMAL MG/DL
HYALINE CASTS #/AREA URNS AUTO: ABNORMAL /LPF
KETONES UR STRIP.AUTO-MCNC: NEGATIVE MG/DL
LEUKOCYTE ESTERASE UR QL STRIP.AUTO: ABNORMAL
MICROALBUMIN UR-MCNC: 16.7 MG/L
MICROALBUMIN/CREAT UR: 9.4 UG/MG CREAT
NITRITE UR QL STRIP.AUTO: NEGATIVE
PH UR STRIP.AUTO: 5.5 [PH]
PHOSPHATE SERPL-MCNC: 3.6 MG/DL (ref 2.5–4.9)
POTASSIUM SERPL-SCNC: 4.1 MMOL/L (ref 3.5–5.3)
PROT UR STRIP.AUTO-MCNC: NEGATIVE MG/DL
RBC # UR STRIP.AUTO: NEGATIVE /UL
RBC #/AREA URNS AUTO: ABNORMAL /HPF
SODIUM SERPL-SCNC: 140 MMOL/L (ref 136–145)
SP GR UR STRIP.AUTO: 1.01
SQUAMOUS #/AREA URNS AUTO: ABNORMAL /HPF
UROBILINOGEN UR STRIP.AUTO-MCNC: NORMAL MG/DL
WBC #/AREA URNS AUTO: ABNORMAL /HPF
YEAST BUDDING #/AREA UR COMP ASSIST: PRESENT /HPF

## 2024-09-26 PROCEDURE — 81001 URINALYSIS AUTO W/SCOPE: CPT

## 2024-09-26 PROCEDURE — 3078F DIAST BP <80 MM HG: CPT | Performed by: INTERNAL MEDICINE

## 2024-09-26 PROCEDURE — 1159F MED LIST DOCD IN RCRD: CPT | Performed by: INTERNAL MEDICINE

## 2024-09-26 PROCEDURE — 82043 UR ALBUMIN QUANTITATIVE: CPT

## 2024-09-26 PROCEDURE — 1160F RVW MEDS BY RX/DR IN RCRD: CPT | Performed by: INTERNAL MEDICINE

## 2024-09-26 PROCEDURE — 3077F SYST BP >= 140 MM HG: CPT | Performed by: INTERNAL MEDICINE

## 2024-09-26 PROCEDURE — 82570 ASSAY OF URINE CREATININE: CPT

## 2024-09-26 PROCEDURE — 99214 OFFICE O/P EST MOD 30 MIN: CPT | Performed by: INTERNAL MEDICINE

## 2024-09-26 NOTE — PATIENT INSTRUCTIONS
Please check home blood pressure morning and evening for 5 days and call readings to our office: [phone].    Follow up with urology for the blood in the urine    Urine sample in the lab today before you leave please    See you back in 6 months.  Have a great Holiday season!          [email or web address].[email or web address]/content/[email or web address]    [facility]    Donate  Kidney Basics  Treatment & Support  Transplantation  Kidney Professionals  Research  Get Involved  You are here  Home » A to Z » Sodium-glucose cotransporter-2 (SGLT2) inhibitors  Sodium-glucose cotransporter-2 (SGLT2) inhibitors  Table of Contents  About sodium-glucose cotransporter-2 (SGLT2) inhibitors  Uses  How SGLT2 inhibitors work  Types  Effectiveness  Side effects  About sodium-glucose cotransporter-2 (SGLT2) inhibitors  Sodium-glucose cotransporter-2 (SGLT2) inhibitors are a class of oral (taken by mouth) prescription medicines that are FDA-approved for use with diet and exercise to lower blood sugar in adults with type 2 diabetes.  Some SGLT2 inhibitors are also FDA-approved for use in people with chronic kidney disease (CKD) and/or heart failure to lower the risk of heart attack, stroke, and/or heart failure flare-ups, including in people who do not have diabetes. Some SGLT2 inhibitors are FDA approved to help slow the progression of kidney disease.  One type of SGLT2 inhibitor is now FDA-approved to help improve blood sugar control in children 10 years and older with type 2 diabetes. Studies are underway to see if SGLT2 inhibitors can be helpful for some kidney diseases in children who do not have diabetes.  SGLT2 inhibitors, which are also called gliflozins, are a class of drugs that lower your blood sugar levels by preventing your kidneys from reabsorbing sugar that is created by your body and the extra sugar leaves through in your urine.  Uses  Originally, SGLT2 inhibitors were developed as oral antidiabetic (blood sugar  lowering) drugs. Later clinical trial data showed significant improvement in kidney health in people with heart failure and/or CKD. This benefit was even higher in people who also had albuminuria.  [email or web address]    How SGLT2 inhibitors work  SGLT2 inhibitors lower blood sugar by causing the kidneys to remove sugar from the body through urine.   SGLT2 inhibitors help to protect the kidneys and heart in people with CKD.  Types  Approved medicines in the SGLT2 inhibitor class include:  Brenzavvy™ (bexaglifloxin)  Invokana® (canagliflozin)  Farxiga® (dapagliflozin)  Jardiance® (empagliflozin)  Steglatro® (ertugliflozin)  All SGLT2 inhibitors work in relatively the same way and are taken orally (by mouth).  Effectiveness  SGLT2 inhibitors are effective at slowing the progression of kidney disease, reducing heart failure, and lowering the risk of kidney failure and death in people with CKD and type 2 diabetes. SGLT2 inhibitors also protect the kidneys in people with CKD who do not have diabetes.  Studies have shown that some medications in this drug class may also reduce the risk of heart failure in people with a history of heart disease. They have also been shown to reduce the need for hospitalization for heart failure.  Side effects  Mild side effects have been reported in people using SGLT2 inhibitors, which may include:  Urinating a lot, including at night  Increased thirst  Some side effects can be serious. If you experience any of these symptoms, call your doctor immediately:  Frequent, urgent, burning, or painful urination  Urine that is cloudy, red, pink, or brown  Pelvic or back pain  Vaginal odor, yeast infection (white or yellowish vaginal discharge that may be lumpy or look like cottage cheese), and/or vaginal itching  Redness, itching, or swelling of the penis; rash on the penis; foul-smelling discharge from the penis; or pain in the skin around the  penis  Feeling tired, weak, or uncomfortable; along with a fever and pain, tenderness, redness, and swelling of the genitals, and/or the area between the genitals and the rectum  Flu-like symptoms  Swelling of the legs or feet  SGLT2 inhibitors may cause other side effects. Call your doctor if you have any unusual problems while taking this medication.  This content is provided for informational use only and is not intended as medical advice or as a substitute for the medical advice of a healthcare professional.  Need help? Call [facility] [email or web address] ([phone])  M-F 9:00am - 7:00pm

## 2024-10-28 ENCOUNTER — APPOINTMENT (OUTPATIENT)
Dept: PODIATRY | Facility: CLINIC | Age: 84
End: 2024-10-28
Payer: MEDICARE

## 2024-11-12 ENCOUNTER — APPOINTMENT (OUTPATIENT)
Dept: OPHTHALMOLOGY | Facility: CLINIC | Age: 84
End: 2024-11-12
Payer: MEDICARE

## 2025-02-26 ENCOUNTER — APPOINTMENT (OUTPATIENT)
Dept: CARDIOLOGY | Facility: CLINIC | Age: 85
End: 2025-02-26
Payer: MEDICARE

## 2025-03-20 ENCOUNTER — APPOINTMENT (OUTPATIENT)
Dept: NEPHROLOGY | Facility: CLINIC | Age: 85
End: 2025-03-20
Payer: MEDICARE

## 2025-03-20 VITALS
HEART RATE: 51 BPM | BODY MASS INDEX: 21.21 KG/M2 | HEIGHT: 66 IN | SYSTOLIC BLOOD PRESSURE: 138 MMHG | DIASTOLIC BLOOD PRESSURE: 63 MMHG | WEIGHT: 132 LBS

## 2025-03-20 DIAGNOSIS — N18.30 STAGE 3 CHRONIC KIDNEY DISEASE, UNSPECIFIED WHETHER STAGE 3A OR 3B CKD (MULTI): Primary | ICD-10-CM

## 2025-03-20 LAB
ALBUMIN SERPL-MCNC: 4.1 G/DL (ref 3.6–5.1)
ALBUMIN/CREAT UR: 8 MG/G CREAT
APPEARANCE UR: CLEAR
BACTERIA #/AREA URNS HPF: ABNORMAL /HPF
BILIRUB UR QL STRIP: NEGATIVE
BUN SERPL-MCNC: 16 MG/DL (ref 7–25)
BUN/CREAT SERPL: 15 (CALC) (ref 6–22)
CALCIUM SERPL-MCNC: 9 MG/DL (ref 8.6–10.4)
CHLORIDE SERPL-SCNC: 104 MMOL/L (ref 98–110)
CO2 SERPL-SCNC: 31 MMOL/L (ref 20–32)
COLOR UR: YELLOW
CREAT SERPL-MCNC: 1.08 MG/DL (ref 0.6–0.95)
CREAT UR-MCNC: 158 MG/DL (ref 20–275)
EGFRCR SERPLBLD CKD-EPI 2021: 50 ML/MIN/1.73M2
ERYTHROCYTE [DISTWIDTH] IN BLOOD BY AUTOMATED COUNT: 13.8 % (ref 11–15)
GLUCOSE SERPL-MCNC: 89 MG/DL (ref 65–99)
GLUCOSE UR QL STRIP: NEGATIVE
HCT VFR BLD AUTO: 37 % (ref 35–45)
HGB BLD-MCNC: 11.9 G/DL (ref 11.7–15.5)
HGB UR QL STRIP: NEGATIVE
HYALINE CASTS #/AREA URNS LPF: ABNORMAL /LPF
KETONES UR QL STRIP: NEGATIVE
LEUKOCYTE ESTERASE UR QL STRIP: ABNORMAL
MCH RBC QN AUTO: 29.2 PG (ref 27–33)
MCHC RBC AUTO-ENTMCNC: 32.2 G/DL (ref 32–36)
MCV RBC AUTO: 90.7 FL (ref 80–100)
MICROALBUMIN UR-MCNC: 1.3 MG/DL
NITRITE UR QL STRIP: NEGATIVE
PH UR STRIP: 6 [PH] (ref 5–8)
PHOSPHATE SERPL-MCNC: 4.2 MG/DL (ref 2.1–4.3)
PLATELET # BLD AUTO: 224 THOUSAND/UL (ref 140–400)
PMV BLD REES-ECKER: 10.3 FL (ref 7.5–12.5)
POTASSIUM SERPL-SCNC: 4 MMOL/L (ref 3.5–5.3)
PROT UR QL STRIP: NEGATIVE
PTH-INTACT SERPL-MCNC: 42 PG/ML (ref 16–77)
RBC # BLD AUTO: 4.08 MILLION/UL (ref 3.8–5.1)
RBC #/AREA URNS HPF: ABNORMAL /HPF
SERVICE CMNT-IMP: ABNORMAL
SODIUM SERPL-SCNC: 141 MMOL/L (ref 135–146)
SP GR UR STRIP: 1.02 (ref 1–1.03)
SQUAMOUS #/AREA URNS HPF: ABNORMAL /HPF
WBC # BLD AUTO: 2.9 THOUSAND/UL (ref 3.8–10.8)
WBC #/AREA URNS HPF: ABNORMAL /HPF

## 2025-03-20 PROCEDURE — 3078F DIAST BP <80 MM HG: CPT | Performed by: INTERNAL MEDICINE

## 2025-03-20 PROCEDURE — 3075F SYST BP GE 130 - 139MM HG: CPT | Performed by: INTERNAL MEDICINE

## 2025-03-20 PROCEDURE — 99213 OFFICE O/P EST LOW 20 MIN: CPT | Performed by: INTERNAL MEDICINE

## 2025-03-20 PROCEDURE — 1159F MED LIST DOCD IN RCRD: CPT | Performed by: INTERNAL MEDICINE

## 2025-03-20 NOTE — PROGRESS NOTES
Surekha Lorenzo is a 85 y.o. female here in follow up for CKD    Subjective   Here in follow up for CKD. She called in to our office yesterday home Bps (see scanned report). Discussed with her that BP at home is too high and also today in the office. She reports taking Amlodipine 10 mg daily and Losartan 50 mg twice a day. I saw she was prescribed chlorthalidone in October and asked her why not taking. She believes the medication did not help her, but today she is in agreement to retry it to improve her BP control.   Otherwise she questions what can take for back pain. I recommended tylenol. Further she suffers from insomnia; I suggested a melatonin trial.    Today she denies chest pain, leg edema, dyspnea, dysuria, hematuria.     ROS  All the rest reviewed and negative    Objective     Vital signs        Physical Exam  Constitutional:  well appearing, in NAD  Psychiatric:  appropriate mood and behavior    HEENT: NC/AT, clear sclerae, moist mucous membranes  Cardiovascular: normal S1, S2, RRR,  no murmurs, gallop or rubs  Pulmonary:  Normal breath sounds  Extremities: no edema  Skin:  warm and dry    Meds    Current Outpatient Medications:     amLODIPine (Norvasc) 10 mg tablet, Take by mouth once daily., Disp: , Rfl:     losartan (Cozaar) 50 mg tablet, Take 1 tablet (50 mg) by mouth 2 times a day., Disp: 180 tablet, Rfl: 3     Allergies  Allergies   Allergen Reactions    Prednisone Swelling and Unknown        Results  Recent Results (from the past week)   Renal Function Panel    Collection Time: 03/19/25  9:25 AM   Result Value Ref Range    GLUCOSE 89 65 - 99 mg/dL    UREA NITROGEN (BUN) 16 7 - 25 mg/dL    CREATININE 1.08 (H) 0.60 - 0.95 mg/dL    EGFR 50 (L) > OR = 60 mL/min/1.73m2    BUN/CREATININE RATIO 15 6 - 22 (calc)    SODIUM 141 135 - 146 mmol/L    POTASSIUM 4.0 3.5 - 5.3 mmol/L    CHLORIDE 104 98 - 110 mmol/L    CARBON DIOXIDE 31 20 - 32 mmol/L    CALCIUM 9.0 8.6 - 10.4 mg/dL    PHOSPHATE (AS PHOSPHORUS) 4.2  2.1 - 4.3 mg/dL    ALBUMIN 4.1 3.6 - 5.1 g/dL   Albumin-Creatinine Ratio, Urine Random    Collection Time: 03/19/25  9:25 AM   Result Value Ref Range    CREATININE, RANDOM URINE 158 20 - 275 mg/dL    ALBUMIN, URINE 1.3 See Note: mg/dL    ALBUMIN/CREATININE RATIO, RANDOM URINE 8 <30 mg/g creat   CBC    Collection Time: 03/19/25  9:25 AM   Result Value Ref Range    WHITE BLOOD CELL COUNT 2.9 (L) 3.8 - 10.8 Thousand/uL    RED BLOOD CELL COUNT 4.08 3.80 - 5.10 Million/uL    HEMOGLOBIN 11.9 11.7 - 15.5 g/dL    HEMATOCRIT 37.0 35.0 - 45.0 %    MCV 90.7 80.0 - 100.0 fL    MCH 29.2 27.0 - 33.0 pg    MCHC 32.2 32.0 - 36.0 g/dL    RDW 13.8 11.0 - 15.0 %    PLATELET COUNT 224 140 - 400 Thousand/uL    MPV 10.3 7.5 - 12.5 fL   Parathyroid Hormone, Intact    Collection Time: 03/19/25  9:25 AM   Result Value Ref Range    PARATHYROID HORMONE, INTACT 42 16 - 77 pg/mL   Urinalysis with Reflex Microscopic    Collection Time: 03/19/25  9:25 AM   Result Value Ref Range    COLOR YELLOW YELLOW    APPEARANCE CLEAR CLEAR    SPECIFIC GRAVITY 1.017 1.001 - 1.035    PH 6.0 5.0 - 8.0    GLUCOSE NEGATIVE NEGATIVE    BILIRUBIN NEGATIVE NEGATIVE    KETONES NEGATIVE NEGATIVE    OCCULT BLOOD NEGATIVE NEGATIVE    PROTEIN NEGATIVE NEGATIVE    NITRITE NEGATIVE NEGATIVE    LEUKOCYTE ESTERASE 2+ (A) NEGATIVE    WBC 0-5 < OR = 5 /HPF    RBC NONE SEEN < OR = 2 /HPF    SQUAMOUS EPITHELIAL CELLS 6-10 (A) < OR = 5 /HPF    BACTERIA FEW (A) NONE SEEN /HPF    HYALINE CAST NONE SEEN NONE SEEN /LPF    NOTE           Imaging results  CT UROGRAM WO/W IVCON  12/5/24  Adrenals: No mass.   Kidneys and urinary tract:   Right: There are no renal calculi or masses.  Right upper pole cyst measuring 1.8 cm.  The opacified calices, renal pelvis and ureter are normal without dilation, filling defect, or stricture.     Left: There are no renal calculi or masses.  Left interpolar cyst   measuring 6.8 cm.  The opacified calices, renal pelvis and ureter are normal without  dilation, filling defect, or stricture, allowing for limited opacification of the left distal ureter.     Bladder: No filling defect, calculus, focal or diffuse wall thickening.     IMPRESSION:     1. No urinary tract calculi.  No suspicious lesions within the bilateral kidneys, ureters and bladder, allowing for suboptimal opacification of the left distal ureter.     Electronically signed by:  MAMIE MELGAR MD on Dec  9 2024     Assessment and Plan  Impression:  1.CKD stage G3a-b/A1, likely hypertensive nephrosclerosis or remote episode of BRIA. Serum creatinine this week stable at 1.08 rendering an eGFR of 50 ml/min/1.73m2.  2. Septated R renal cyst per 10/22 US. Undergoing evaluation for gross hematuria at [facility]. CT urogram (above) reassuring.  3. HTN - BP above goal here today. Home Bps also suboptimal. Recommended to restart chlorthalidone at 12.5 mg daily     Plan/recommendations:   - restart Chlorthalidone at 12.5 mg daily. May have to increase to 25 mg based on follow up readings.  - low salt diet, exercise as able  - consider statin  - continue home BP check am and pm once a week - goal 130/80 or less  - avoid NSAID    RTC in follow up in 6-8 months.       Eileen Russell MD

## 2025-03-20 NOTE — PATIENT INSTRUCTIONS
Please restart taking Chlorthalidone half a pilll daily (12.5 mg out of the 25 mg pill)  Check blood pressure at home one day every week, morning and evening and keep record of the readings. BP goal is 130/80.    For the sleeping problems you can try Melatonin 5 mg 1 hour before going to bed.  Tylenol should be safe for back pain.  See you in follow up in 6-8 months.

## 2025-08-14 ENCOUNTER — APPOINTMENT (OUTPATIENT)
Dept: NEPHROLOGY | Facility: CLINIC | Age: 85
End: 2025-08-14
Payer: MEDICARE